# Patient Record
Sex: MALE | Race: OTHER | NOT HISPANIC OR LATINO | URBAN - METROPOLITAN AREA
[De-identification: names, ages, dates, MRNs, and addresses within clinical notes are randomized per-mention and may not be internally consistent; named-entity substitution may affect disease eponyms.]

---

## 2021-11-12 ENCOUNTER — INPATIENT (INPATIENT)
Facility: HOSPITAL | Age: 46
LOS: 2 days | Discharge: HOME | DRG: 247 | End: 2021-11-15
Attending: INTERNAL MEDICINE | Admitting: INTERNAL MEDICINE
Payer: COMMERCIAL

## 2021-11-12 VITALS — WEIGHT: 268.08 LBS

## 2021-11-12 DIAGNOSIS — I21.3 ST ELEVATION (STEMI) MYOCARDIAL INFARCTION OF UNSPECIFIED SITE: ICD-10-CM

## 2021-11-12 LAB
A1C WITH ESTIMATED AVERAGE GLUCOSE RESULT: 5.8 % — HIGH (ref 4–5.6)
ALBUMIN SERPL ELPH-MCNC: 4.1 G/DL — SIGNIFICANT CHANGE UP (ref 3.3–5)
ALBUMIN SERPL ELPH-MCNC: 4.1 G/DL — SIGNIFICANT CHANGE UP (ref 3.3–5)
ALBUMIN SERPL ELPH-MCNC: 4.2 G/DL — SIGNIFICANT CHANGE UP (ref 3.3–5)
ALP SERPL-CCNC: 142 U/L — HIGH (ref 40–120)
ALP SERPL-CCNC: 145 U/L — HIGH (ref 40–120)
ALP SERPL-CCNC: 147 U/L — HIGH (ref 40–120)
ALT FLD-CCNC: 18 U/L — SIGNIFICANT CHANGE UP (ref 10–45)
ALT FLD-CCNC: 19 U/L — SIGNIFICANT CHANGE UP (ref 10–45)
ALT FLD-CCNC: 28 U/L — SIGNIFICANT CHANGE UP (ref 10–45)
ANION GAP SERPL CALC-SCNC: 13 MMOL/L — SIGNIFICANT CHANGE UP (ref 5–17)
ANION GAP SERPL CALC-SCNC: 14 MMOL/L — SIGNIFICANT CHANGE UP (ref 5–17)
ANION GAP SERPL CALC-SCNC: 17 MMOL/L — SIGNIFICANT CHANGE UP (ref 5–17)
ANISOCYTOSIS BLD QL: SLIGHT — SIGNIFICANT CHANGE UP
APTT BLD: 141.1 SEC — CRITICAL HIGH (ref 27.5–35.5)
AST SERPL-CCNC: 105 U/L — HIGH (ref 10–40)
AST SERPL-CCNC: 31 U/L — SIGNIFICANT CHANGE UP (ref 10–40)
AST SERPL-CCNC: 47 U/L — HIGH (ref 10–40)
BASOPHILS # BLD AUTO: 0.11 K/UL — SIGNIFICANT CHANGE UP (ref 0–0.2)
BASOPHILS NFR BLD AUTO: 0.9 % — SIGNIFICANT CHANGE UP (ref 0–2)
BILIRUB SERPL-MCNC: 0.5 MG/DL — SIGNIFICANT CHANGE UP (ref 0.2–1.2)
BILIRUB SERPL-MCNC: 0.5 MG/DL — SIGNIFICANT CHANGE UP (ref 0.2–1.2)
BILIRUB SERPL-MCNC: 1.2 MG/DL — SIGNIFICANT CHANGE UP (ref 0.2–1.2)
BLD GP AB SCN SERPL QL: NEGATIVE — SIGNIFICANT CHANGE UP
BUN SERPL-MCNC: 11 MG/DL — SIGNIFICANT CHANGE UP (ref 7–23)
BUN SERPL-MCNC: 12 MG/DL — SIGNIFICANT CHANGE UP (ref 7–23)
BUN SERPL-MCNC: 9 MG/DL — SIGNIFICANT CHANGE UP (ref 7–23)
CALCIUM SERPL-MCNC: 9 MG/DL — SIGNIFICANT CHANGE UP (ref 8.4–10.5)
CALCIUM SERPL-MCNC: 9.2 MG/DL — SIGNIFICANT CHANGE UP (ref 8.4–10.5)
CALCIUM SERPL-MCNC: 9.3 MG/DL — SIGNIFICANT CHANGE UP (ref 8.4–10.5)
CHLORIDE SERPL-SCNC: 102 MMOL/L — SIGNIFICANT CHANGE UP (ref 96–108)
CHLORIDE SERPL-SCNC: 107 MMOL/L — SIGNIFICANT CHANGE UP (ref 96–108)
CHLORIDE SERPL-SCNC: 107 MMOL/L — SIGNIFICANT CHANGE UP (ref 96–108)
CHOLEST SERPL-MCNC: 212 MG/DL — HIGH
CK MB BLD-MCNC: 7.4 % — HIGH (ref 0–3.5)
CK MB BLD-MCNC: 9.4 % — HIGH (ref 0–3.5)
CK MB CFR SERPL CALC: 49 NG/ML — HIGH (ref 0–6.7)
CK MB CFR SERPL CALC: 59.9 NG/ML — HIGH (ref 0–6.7)
CK SERPL-CCNC: 524 U/L — HIGH (ref 30–200)
CK SERPL-CCNC: 806 U/L — HIGH (ref 30–200)
CO2 SERPL-SCNC: 17 MMOL/L — LOW (ref 22–31)
CO2 SERPL-SCNC: 20 MMOL/L — LOW (ref 22–31)
CO2 SERPL-SCNC: 21 MMOL/L — LOW (ref 22–31)
COVID-19 NUCLEOCAPSID GAM AB INTERP: NEGATIVE — SIGNIFICANT CHANGE UP
COVID-19 NUCLEOCAPSID TOTAL GAM ANTIBODY RESULT: 0.08 INDEX — SIGNIFICANT CHANGE UP
CREAT SERPL-MCNC: 0.96 MG/DL — SIGNIFICANT CHANGE UP (ref 0.5–1.3)
CREAT SERPL-MCNC: 1.02 MG/DL — SIGNIFICANT CHANGE UP (ref 0.5–1.3)
CREAT SERPL-MCNC: 1.11 MG/DL — SIGNIFICANT CHANGE UP (ref 0.5–1.3)
DACRYOCYTES BLD QL SMEAR: SLIGHT — SIGNIFICANT CHANGE UP
ELLIPTOCYTES BLD QL SMEAR: SLIGHT — SIGNIFICANT CHANGE UP
EOSINOPHIL # BLD AUTO: 0 K/UL — SIGNIFICANT CHANGE UP (ref 0–0.5)
EOSINOPHIL NFR BLD AUTO: 0 % — SIGNIFICANT CHANGE UP (ref 0–6)
ESTIMATED AVERAGE GLUCOSE: 120 MG/DL — HIGH (ref 68–114)
GLUCOSE SERPL-MCNC: 114 MG/DL — HIGH (ref 70–99)
GLUCOSE SERPL-MCNC: 116 MG/DL — HIGH (ref 70–99)
GLUCOSE SERPL-MCNC: 153 MG/DL — HIGH (ref 70–99)
HCT VFR BLD CALC: 37.4 % — LOW (ref 39–50)
HDLC SERPL-MCNC: 26 MG/DL — LOW
HGB BLD-MCNC: 10.5 G/DL — LOW (ref 13–17)
INR BLD: 1.13 RATIO — SIGNIFICANT CHANGE UP (ref 0.88–1.16)
LIPID PNL WITH DIRECT LDL SERPL: 118 MG/DL — HIGH
LYMPHOCYTES # BLD AUTO: 1.19 K/UL — SIGNIFICANT CHANGE UP (ref 1–3.3)
LYMPHOCYTES # BLD AUTO: 9.8 % — LOW (ref 13–44)
MACROCYTES BLD QL: SLIGHT — SIGNIFICANT CHANGE UP
MAGNESIUM SERPL-MCNC: 2 MG/DL — SIGNIFICANT CHANGE UP (ref 1.6–2.6)
MAGNESIUM SERPL-MCNC: 2 MG/DL — SIGNIFICANT CHANGE UP (ref 1.6–2.6)
MAGNESIUM SERPL-MCNC: 2.1 MG/DL — SIGNIFICANT CHANGE UP (ref 1.6–2.6)
MANUAL SMEAR VERIFICATION: SIGNIFICANT CHANGE UP
MCHC RBC-ENTMCNC: 20.2 PG — LOW (ref 27–34)
MCHC RBC-ENTMCNC: 28.1 GM/DL — LOW (ref 32–36)
MCV RBC AUTO: 72.1 FL — LOW (ref 80–100)
MICROCYTES BLD QL: SIGNIFICANT CHANGE UP
MONOCYTES # BLD AUTO: 0.54 K/UL — SIGNIFICANT CHANGE UP (ref 0–0.9)
MONOCYTES NFR BLD AUTO: 4.5 % — SIGNIFICANT CHANGE UP (ref 2–14)
NEUTROPHILS # BLD AUTO: 10.27 K/UL — HIGH (ref 1.8–7.4)
NEUTROPHILS NFR BLD AUTO: 84.8 % — HIGH (ref 43–77)
NON HDL CHOLESTEROL: 187 MG/DL — HIGH
NT-PROBNP SERPL-SCNC: 708 PG/ML — HIGH (ref 0–300)
OVALOCYTES BLD QL SMEAR: SLIGHT — SIGNIFICANT CHANGE UP
PHOSPHATE SERPL-MCNC: 2.6 MG/DL — SIGNIFICANT CHANGE UP (ref 2.5–4.5)
PHOSPHATE SERPL-MCNC: 3.4 MG/DL — SIGNIFICANT CHANGE UP (ref 2.5–4.5)
PLAT MORPH BLD: NORMAL — SIGNIFICANT CHANGE UP
PLATELET # BLD AUTO: 342 K/UL — SIGNIFICANT CHANGE UP (ref 150–400)
POLYCHROMASIA BLD QL SMEAR: SLIGHT — SIGNIFICANT CHANGE UP
POTASSIUM SERPL-MCNC: 3.7 MMOL/L — SIGNIFICANT CHANGE UP (ref 3.5–5.3)
POTASSIUM SERPL-MCNC: 3.9 MMOL/L — SIGNIFICANT CHANGE UP (ref 3.5–5.3)
POTASSIUM SERPL-MCNC: 3.9 MMOL/L — SIGNIFICANT CHANGE UP (ref 3.5–5.3)
POTASSIUM SERPL-SCNC: 3.7 MMOL/L — SIGNIFICANT CHANGE UP (ref 3.5–5.3)
POTASSIUM SERPL-SCNC: 3.9 MMOL/L — SIGNIFICANT CHANGE UP (ref 3.5–5.3)
POTASSIUM SERPL-SCNC: 3.9 MMOL/L — SIGNIFICANT CHANGE UP (ref 3.5–5.3)
PROT SERPL-MCNC: 7.1 G/DL — SIGNIFICANT CHANGE UP (ref 6–8.3)
PROT SERPL-MCNC: 7.5 G/DL — SIGNIFICANT CHANGE UP (ref 6–8.3)
PROT SERPL-MCNC: 7.7 G/DL — SIGNIFICANT CHANGE UP (ref 6–8.3)
PROTHROM AB SERPL-ACNC: 13.5 SEC — SIGNIFICANT CHANGE UP (ref 10.6–13.6)
RBC # BLD: 5.19 M/UL — SIGNIFICANT CHANGE UP (ref 4.2–5.8)
RBC # FLD: 16.2 % — HIGH (ref 10.3–14.5)
RBC BLD AUTO: ABNORMAL
RH IG SCN BLD-IMP: POSITIVE — SIGNIFICANT CHANGE UP
RH IG SCN BLD-IMP: POSITIVE — SIGNIFICANT CHANGE UP
SARS-COV-2 IGG+IGM SERPL QL IA: 0.08 INDEX — SIGNIFICANT CHANGE UP
SARS-COV-2 IGG+IGM SERPL QL IA: NEGATIVE — SIGNIFICANT CHANGE UP
SARS-COV-2 RNA SPEC QL NAA+PROBE: SIGNIFICANT CHANGE UP
SODIUM SERPL-SCNC: 137 MMOL/L — SIGNIFICANT CHANGE UP (ref 135–145)
SODIUM SERPL-SCNC: 140 MMOL/L — SIGNIFICANT CHANGE UP (ref 135–145)
SODIUM SERPL-SCNC: 141 MMOL/L — SIGNIFICANT CHANGE UP (ref 135–145)
TARGETS BLD QL SMEAR: SLIGHT — SIGNIFICANT CHANGE UP
TRIGL SERPL-MCNC: 343 MG/DL — HIGH
TROPONIN T, HIGH SENSITIVITY RESULT: 2142 NG/L — HIGH (ref 0–51)
TROPONIN T, HIGH SENSITIVITY RESULT: 636 NG/L — HIGH (ref 0–51)
TROPONIN T, HIGH SENSITIVITY RESULT: 905 NG/L — HIGH (ref 0–51)
TSH SERPL-MCNC: 1.25 UIU/ML — SIGNIFICANT CHANGE UP (ref 0.27–4.2)
WBC # BLD: 12.11 K/UL — HIGH (ref 3.8–10.5)
WBC # FLD AUTO: 12.11 K/UL — HIGH (ref 3.8–10.5)

## 2021-11-12 PROCEDURE — 99232 SBSQ HOSP IP/OBS MODERATE 35: CPT

## 2021-11-12 PROCEDURE — 92978 ENDOLUMINL IVUS OCT C 1ST: CPT | Mod: 26,LD

## 2021-11-12 PROCEDURE — 92921: CPT | Mod: NC,LD

## 2021-11-12 PROCEDURE — 93010 ELECTROCARDIOGRAM REPORT: CPT

## 2021-11-12 PROCEDURE — 93306 TTE W/DOPPLER COMPLETE: CPT | Mod: 26

## 2021-11-12 PROCEDURE — 92941 PRQ TRLML REVSC TOT OCCL AMI: CPT | Mod: LD

## 2021-11-12 PROCEDURE — 93010 ELECTROCARDIOGRAM REPORT: CPT | Mod: 59

## 2021-11-12 PROCEDURE — 99152 MOD SED SAME PHYS/QHP 5/>YRS: CPT

## 2021-11-12 PROCEDURE — 99291 CRITICAL CARE FIRST HOUR: CPT

## 2021-11-12 PROCEDURE — 99291 CRITICAL CARE FIRST HOUR: CPT | Mod: 25

## 2021-11-12 PROCEDURE — 71045 X-RAY EXAM CHEST 1 VIEW: CPT | Mod: 26

## 2021-11-12 PROCEDURE — 93458 L HRT ARTERY/VENTRICLE ANGIO: CPT | Mod: 26,59

## 2021-11-12 RX ORDER — LISINOPRIL 2.5 MG/1
5 TABLET ORAL DAILY
Refills: 0 | Status: DISCONTINUED | OUTPATIENT
Start: 2021-11-12 | End: 2021-11-15

## 2021-11-12 RX ORDER — HEPARIN SODIUM 5000 [USP'U]/ML
4000 INJECTION INTRAVENOUS; SUBCUTANEOUS EVERY 6 HOURS
Refills: 0 | Status: DISCONTINUED | OUTPATIENT
Start: 2021-11-12 | End: 2021-11-12

## 2021-11-12 RX ORDER — ATORVASTATIN CALCIUM 80 MG/1
80 TABLET, FILM COATED ORAL AT BEDTIME
Refills: 0 | Status: DISCONTINUED | OUTPATIENT
Start: 2021-11-12 | End: 2021-11-15

## 2021-11-12 RX ORDER — HEPARIN SODIUM 5000 [USP'U]/ML
1000 INJECTION INTRAVENOUS; SUBCUTANEOUS
Qty: 25000 | Refills: 0 | Status: DISCONTINUED | OUTPATIENT
Start: 2021-11-12 | End: 2021-11-12

## 2021-11-12 RX ORDER — LISINOPRIL 2.5 MG/1
2.5 TABLET ORAL DAILY
Refills: 0 | Status: DISCONTINUED | OUTPATIENT
Start: 2021-11-12 | End: 2021-11-12

## 2021-11-12 RX ORDER — HEPARIN SODIUM 5000 [USP'U]/ML
INJECTION INTRAVENOUS; SUBCUTANEOUS
Qty: 25000 | Refills: 0 | Status: DISCONTINUED | OUTPATIENT
Start: 2021-11-12 | End: 2021-11-12

## 2021-11-12 RX ORDER — ASPIRIN/CALCIUM CARB/MAGNESIUM 324 MG
81 TABLET ORAL DAILY
Refills: 0 | Status: DISCONTINUED | OUTPATIENT
Start: 2021-11-12 | End: 2021-11-15

## 2021-11-12 RX ORDER — POTASSIUM CHLORIDE 20 MEQ
20 PACKET (EA) ORAL ONCE
Refills: 0 | Status: DISCONTINUED | OUTPATIENT
Start: 2021-11-12 | End: 2021-11-13

## 2021-11-12 RX ORDER — METOPROLOL TARTRATE 50 MG
25 TABLET ORAL
Refills: 0 | Status: COMPLETED | OUTPATIENT
Start: 2021-11-12 | End: 2021-11-13

## 2021-11-12 RX ORDER — HYDRALAZINE HCL 50 MG
10 TABLET ORAL ONCE
Refills: 0 | Status: COMPLETED | OUTPATIENT
Start: 2021-11-12 | End: 2021-11-12

## 2021-11-12 RX ORDER — INFLUENZA VIRUS VACCINE 15; 15; 15; 15 UG/.5ML; UG/.5ML; UG/.5ML; UG/.5ML
0.5 SUSPENSION INTRAMUSCULAR ONCE
Refills: 0 | Status: DISCONTINUED | OUTPATIENT
Start: 2021-11-12 | End: 2021-11-15

## 2021-11-12 RX ORDER — METOPROLOL TARTRATE 50 MG
12.5 TABLET ORAL
Refills: 0 | Status: DISCONTINUED | OUTPATIENT
Start: 2021-11-12 | End: 2021-11-12

## 2021-11-12 RX ORDER — TICAGRELOR 90 MG/1
1 TABLET ORAL
Qty: 60 | Refills: 0
Start: 2021-11-12 | End: 2021-12-11

## 2021-11-12 RX ORDER — TICAGRELOR 90 MG/1
90 TABLET ORAL EVERY 12 HOURS
Refills: 0 | Status: DISCONTINUED | OUTPATIENT
Start: 2021-11-12 | End: 2021-11-15

## 2021-11-12 RX ORDER — CHLORHEXIDINE GLUCONATE 213 G/1000ML
1 SOLUTION TOPICAL
Refills: 0 | Status: DISCONTINUED | OUTPATIENT
Start: 2021-11-12 | End: 2021-11-15

## 2021-11-12 RX ADMIN — Medication 10 MILLIGRAM(S): at 17:05

## 2021-11-12 RX ADMIN — TICAGRELOR 90 MILLIGRAM(S): 90 TABLET ORAL at 17:05

## 2021-11-12 RX ADMIN — Medication 12.5 MILLIGRAM(S): at 08:48

## 2021-11-12 RX ADMIN — HEPARIN SODIUM 10 UNIT(S)/HR: 5000 INJECTION INTRAVENOUS; SUBCUTANEOUS at 06:45

## 2021-11-12 RX ADMIN — ATORVASTATIN CALCIUM 80 MILLIGRAM(S): 80 TABLET, FILM COATED ORAL at 21:36

## 2021-11-12 RX ADMIN — CHLORHEXIDINE GLUCONATE 1 APPLICATION(S): 213 SOLUTION TOPICAL at 06:02

## 2021-11-12 RX ADMIN — HEPARIN SODIUM 1000 UNIT(S)/HR: 5000 INJECTION INTRAVENOUS; SUBCUTANEOUS at 01:58

## 2021-11-12 RX ADMIN — Medication 25 MILLIGRAM(S): at 14:10

## 2021-11-12 RX ADMIN — TICAGRELOR 90 MILLIGRAM(S): 90 TABLET ORAL at 06:02

## 2021-11-12 RX ADMIN — LISINOPRIL 2.5 MILLIGRAM(S): 2.5 TABLET ORAL at 14:02

## 2021-11-12 RX ADMIN — Medication 81 MILLIGRAM(S): at 11:55

## 2021-11-12 NOTE — ED ADULT NURSE REASSESSMENT NOTE - NS ED NURSE REASSESS COMMENT FT1
Pt ordered for Heparin infusion following ACS nomogram. Per MD Galeas, pt okay to have Heparin infusion started before coagulants result and that pt does not require Heparin bolus because he received a 5000unit bolus at [facility]. Heparin infusion started with 2nd RN Cody. Pt made aware of signs & symptoms of bleeding to make RN or MD aware of. Cardiology MD at bedside.

## 2021-11-12 NOTE — H&P ADULT - ASSESSMENT
45 y/o M no known pmh here with 4 days of chest pain which worsened today and began to radiate to both arms. Went to [facility], found to have inf wall stemi and transferred to [facility] on heparin gtt after asa/plavix load. Upon arrival NINFA resolved and pt CP free.           Neuro  - A+Ox3 ANA LAURA    Pulm  - resting comfortably on RA    Card  #ACS  - NINFA resolved on EKG, pt cp free  - Will need non emergent cath  - GDMT - DAPT w/ asa brilinta, statin, hold BB for now until TTE  -STAT TTE in AM    GI  - DASH diet    Endo  - Check A1C  - ISS as needed    Renal  - trend Cr  - K>4, Mg >2  Heme  - was on hep gtt -> DAPT  - monitor CBC    ID  - no infectious sx, afebrile, monitor off abx  -leukocytosis likely reactive 47 y/o M no known pmh here with 4 days of chest pain which worsened today and began to radiate to both arms. Went to [facility], found to have inf wall stemi and transferred to [facility] on heparin gtt after asa/plavix load. Upon arrival NINFA resolved and pt CP free.       Neuro  - A+Ox3 ANA LAURA    Pulm  - resting comfortably on RA    Card  #ACS  - NINFA resolved on EKG, pt cp free  - Will need non emergent cath  - GDMT - DAPT w/ asa brilinta, statin, hold BB for now until TTE  - STAT TTE in AM  - Trend CE    GI  - DASH diet    Endo  - Check A1C  - ISS as needed    Renal  - trend Cr  - K>4, Mg >2  Heme  - was on hep gtt -> DAPT  - monitor CBC    ID  - no infectious sx, afebrile, monitor off abx  - leukocytosis likely reactive

## 2021-11-12 NOTE — H&P ADULT - NSHPLABSRESULTS_GEN_ALL_CORE
.  LABS:                         10.5   12.11 )-----------( 342      ( 12 Nov 2021 01:48 )             37.4     11-12    140  |  107  |  12  ----------------------------<  153<H>  3.7   |  20<L>  |  1.11    Ca    9.2      12 Nov 2021 01:48  Mg     2.0     11-12    TPro  7.5  /  Alb  4.1  /  TBili  0.5  /  DBili  x   /  AST  31  /  ALT  19  /  AlkPhos  145<H>  11-12    PT/INR - ( 12 Nov 2021 01:48 )   PT: 13.5 sec;   INR: 1.13 ratio         PTT - ( 12 Nov 2021 01:48 )  PTT:141.1 sec    Serum Pro-Brain Natriuretic Peptide: 708 pg/mL (11-12 @ 01:48)        RADIOLOGY, EKG & ADDITIONAL TESTS: Reviewed.     ICU Vital Signs Last 24 Hrs  T(C): 36.8 (12 Nov 2021 05:34), Max: 36.8 (12 Nov 2021 04:41)  T(F): 98.2 (12 Nov 2021 05:34), Max: 98.2 (12 Nov 2021 04:41)  HR: 98 (12 Nov 2021 06:00) (98 - 107)  BP: 141/92 (12 Nov 2021 06:00) (132/86 - 158/86)  BP(mean): 111 (12 Nov 2021 06:00) (97 - 117)  ABP: --  ABP(mean): --  RR: 14 (12 Nov 2021 06:00) (14 - 24)  SpO2: 97% (12 Nov 2021 06:00) (96% - 98%)

## 2021-11-12 NOTE — H&P ADULT - ATTENDING COMMENTS
Admitted with late presentation lateral wall STEMI   Will go for cath today  DAPT with ASA, Ticagrelor   Lipitor 80  Hemodynamics acceptable, chest pain free s/p Nitro SL  Check TTE  Trend cardiac enzymes until peak  O2 sats mid 90s on nasal cannula  Normal renal function  H/H low but acceptable on Heparin gtt for ACS  COVID negative, no antibiotics   Sugars controlled, check Hgb A1c   No central access

## 2021-11-12 NOTE — ED ADULT NURSE NOTE - OBJECTIVE STATEMENT
Pt is a 47 y/o male presents to the ED BIBA transferred from [facility] for STEMI. Pt says he has had chest pain that started on Monday and wke him up from sleep, states he felt like it was GERD so he ignored it. He then tonight had episodes of the chest discomfort that were associated with b/l arm pain/numbness & diaphoresis so he went to OSH. At OSH his EKG had elevations and he was hypertensive, received 180mg of Brilinta, 324 of ASA. 0.4 sublingual NTG, NTG paste & 5mg of Metoprolol push x 2. Pt presents to [facility] well appearing, states chest pain at this time is resolved. He is alert & oriented x 4, calm, able to follow commands, speech clear. Breathing spontaneous & nonlabored. On cardiac monitor, sinus tach 105. Abdomen soft & nondistended. Strength 5/5 x 4 extremities, ambulates without assist. Strong peripheral pulses noted b/l, no edema noted. Skin warm, clean, dry & intact. Denies SOB, abdominal pain,  n/v/d, fever, chills, changes in vision. Call bell within reach, bed in lowest position, side rails up, wheels locked. Pt is a 47 y/o male presents to the ED BIBA transferred from [facility] for STEMI. Pt says he has had chest pain that started on Monday and wke him up from sleep, states he felt like it was GERD so he ignored it. He then tonight had episodes of the chest discomfort that were associated with b/l arm pain/numbness & diaphoresis so he went to OSH. At OSH his EKG had elevations and he was hypertensive, received 180mg of Brilinta, 324 of ASA. 0.4 sublingual NTG, NTG paste & 5mg of Metoprolol push x 2. Pt presents to [facility] well appearing, states chest pain at this time is resolved. He is alert & oriented x 4, calm, able to follow commands, speech clear. Breathing spontaneous & nonlabored. On cardiac monitor, sinus tach 105. Abdomen soft & nondistended. Strength 5/5 x 4 extremities, ambulates without assist. Strong peripheral pulses noted b/l, no edema noted. Skin warm, clean, dry & intact. Denies SOB, abdominal pain,  n/v/d, fever, chills, changes in vision. Call bell within reach, bed in lowest position, side rails up, wheels locked.

## 2021-11-12 NOTE — PROGRESS NOTE ADULT - SUBJECTIVE AND OBJECTIVE BOX
LOU RICCI  MRN-[id number]  Patient is a 46y old  Male who presents with a chief complaint of NSTEMI (12 Nov 2021 06:25)    HPI:  45 y/o M no known pmh here with 4 days of chest pain which worsened today and began to radiate to both arms. Went to [facility], found to have ant wall stemi and transferred to [facility] on heparin gtt after asa/plavix load. Upon arrival s/p NTG x1 pt CP free. EKG no longer with elevations. Decision made to defer cath for now.     Currently pt feels well resting comfortably   (12 Nov 2021 06:25)      Hospital Course:    24 HOUR EVENTS:    REVIEW OF SYSTEMS:    CONSTITUTIONAL: No weakness, fevers or chills  EYES/ENT: No visual changes;  No vertigo or throat pain   NECK: No pain or stiffness  RESPIRATORY: No cough, wheezing, hemoptysis; No shortness of breath  CARDIOVASCULAR: No chest pain or palpitations  GASTROINTESTINAL: No abdominal or epigastric pain. No nausea, vomiting, or hematemesis; No diarrhea or constipation. No melena or hematochezia.  GENITOURINARY: No dysuria, frequency or hematuria  NEUROLOGICAL: No numbness or weakness  SKIN: No itching, rashes      ICU Vital Signs Last 24 Hrs  T(C): 36.9 (12 Nov 2021 19:00), Max: 36.9 (12 Nov 2021 19:00)  T(F): 98.4 (12 Nov 2021 19:00), Max: 98.4 (12 Nov 2021 19:00)  HR: 101 (12 Nov 2021 19:00) (89 - 107)  BP: 158/83 (12 Nov 2021 19:00) (130/75 - 177/92)  BP(mean): 112 (12 Nov 2021 19:00) (95 - 127)  ABP: --  ABP(mean): --  RR: 28 (12 Nov 2021 19:00) (14 - 32)  SpO2: 96% (12 Nov 2021 19:00) (92% - 99%)    I&O's Summary    11 Nov 2021 07:01  -  12 Nov 2021 07:00  --------------------------------------------------------  IN: 130 mL / OUT: 500 mL / NET: -370 mL    12 Nov 2021 07:01  -  12 Nov 2021 19:46  --------------------------------------------------------  IN: 340 mL / OUT: 2420 mL / NET: -2080 mL      CAPILLARY BLOOD GLUCOSE          PHYSICAL EXAM:  GENERAL: No acute distress, well-developed  HEAD:  Atraumatic, Normocephalic  EYES: EOMI, PERRLA, conjunctiva and sclera clear  NECK: Supple, no lymphadenopathy, no JVD  CHEST/LUNG: CTAB; No wheezes, rales, or rhonchi  HEART: Regular rate and rhythm. Normal S1/S2. No murmurs, rubs, or gallops  ABDOMEN: Soft, non-tender, non-distended; normal bowel sounds, no organomegaly  EXTREMITIES:  2+ peripheral pulses b/l, No clubbing, cyanosis, or edema  NEUROLOGY: A&O x 3, no focal deficits  SKIN: No rashes or lesions    ============================I/O===========================   I&O's Detail    11 Nov 2021 07:01  -  12 Nov 2021 07:00  --------------------------------------------------------  IN:    Heparin: 10 mL    Oral Fluid: 120 mL  Total IN: 130 mL    OUT:    Voided (mL): 500 mL  Total OUT: 500 mL    Total NET: -370 mL      12 Nov 2021 07:01  -  12 Nov 2021 19:46  --------------------------------------------------------  IN:    Heparin: 40 mL    Oral Fluid: 300 mL  Total IN: 340 mL    OUT:    Voided (mL): 2420 mL  Total OUT: 2420 mL    Total NET: -2080 mL        ============================ LABS =========================                        10.5   12.11 )-----------( 342      ( 12 Nov 2021 01:48 )             37.4     11-12    141  |  107  |  11  ----------------------------<  114<H>  3.9   |  17<L>  |  0.96    Ca    9.0      12 Nov 2021 06:18  Phos  2.6     11-12  Mg     2.0     11-12    TPro  7.1  /  Alb  4.2  /  TBili  0.5  /  DBili  x   /  AST  47<H>  /  ALT  18  /  AlkPhos  142<H>  11-12    Troponin T, High Sensitivity Result: 905 ng/L (11-12-21 @ 06:18)  Troponin T, High Sensitivity Result: 636 ng/L (11-12-21 @ 01:48)    CKMB Units: 49.0 ng/mL (11-12-21 @ 06:18)    Creatine Kinase, Serum: 524 U/L (11-12-21 @ 06:18)    CPK Mass Assay %: 9.4 % (11-12-21 @ 06:18)        LIVER FUNCTIONS - ( 12 Nov 2021 06:18 )  Alb: 4.2 g/dL / Pro: 7.1 g/dL / ALK PHOS: 142 U/L / ALT: 18 U/L / AST: 47 U/L / GGT: x           PT/INR - ( 12 Nov 2021 01:48 )   PT: 13.5 sec;   INR: 1.13 ratio         PTT - ( 12 Nov 2021 01:48 )  PTT:141.1 sec    Blood Gas Venous - Lactate: 1.8 mmol/L (11-12-21 @ 01:40)      ======================Micro/Rad/Cardio=================  Telemtry: Reviewed   EKG: Reviewed  CXR: Reviewed  Culture: Reviewed   Echo:   Cath:   ======================================================  PAST MEDICAL & SURGICAL HISTORY:    ====================ASSESSMENT ==============        Plan:  ====================== NEUROLOGY=====================  - A+Ox3 ANA LAURA    ==================== RESPIRATORY======================  - resting comfortably on RA    ====================CARDIOVASCULAR==================  Card  #ACS  - NINFA resolved on EKG, pt cp free  - Will need non emergent cath  - GDMT - DAPT w/ asa brilinta, statin, hold BB for now until TTE  - STAT TTE in AM  - Trend CE  - lopressor for rate control   - ASA/lipitor for graft patency    lisinopril 5 milliGRAM(s) Oral daily  metoprolol tartrate 25 milliGRAM(s) Oral two times a day  atorvastatin 80 milliGRAM(s) Oral at bedtime  aspirin enteric coated 81 milliGRAM(s) Oral daily    ===================HEMATOLOGIC/ONC ===================  - was on hep gtt -> DAPT  - monitor CBC    ===================== RENAL =========================  - trend Cr  - K>4, Mg >2    ==================== GASTROINTESTINAL===================  - DASH diet    =======================    ENDOCRINE  =====================  - Check A1C  - ISS as needed    ========================INFECTIOUS DISEASE================  - no infectious sx, afebrile, monitor off abx  - leukocytosis likely reactive  - WBC 12.11, continue trending     Patient requires continuous monitoring with bedside rhythm monitoring, pulse ox monitoring, and intermittent blood gas analysis. Care plan discussed with ICU care team. Patient remained critical and at risk for life threatening decompensation.  Patient seen, examined and plan discussed with CCU team during rounds.     I have personally provided ____ minutes of critical care time excluding time spent on separate procedures, in addition to initial critical care time provided by the CICU Attending, Dr. Delcid/ Jasiel/ Terra/ Leo/ Luis/ Rodney .     By signing my name below, I, Kiki Bartholomew, attest that this documentation has been prepared under the direction and in the presence of NANCY Maier   Electronically signed: Masha Brito, 11-12-21 @ 19:46    I, Latha Piña, personally performed the services described in this documentation. all medical record entries made by the veritoibe were at my direction and in my presence. I have reviewed the chart and agree that the record reflects my personal performance and is accurate and complete  Electronically signed: NANCY Maier        LOU RICCI  MRN-[id number]  Patient is a 46y old  Male who presents with a chief complaint of NSTEMI (12 Nov 2021 06:25)    HPI:  47 y/o M no known pmh here with 4 days of chest pain which worsened today and began to radiate to both arms. Went to [facility], found to have ant wall stemi and transferred to [facility] on heparin gtt after asa/plavix load. Upon arrival s/p NTG x1 pt CP free. EKG no longer with elevations. Decision made to defer cath for now.     Currently pt feels well resting comfortably   (12 Nov 2021 06:25)      Hospital Course:  11/12 admitted with STEMI, HTN to 210/130 with improvement in CP, BP and NINFA with SL nitro + nitro paste  11/13 s/p LHC with HALLE x1 to 80% LAD and POBA to diagonal    24 HOUR EVENTS:  - no acute events. Chest pain free    REVIEW OF SYSTEMS:  RESPIRATORY: No shortness of breath  CARDIOVASCULAR: No chest pain or palpitations, no orthopnea  GASTROINTESTINAL: No abdominal pain.    ICU Vital Signs Last 24 Hrs  T(C): 36.9 (12 Nov 2021 19:00), Max: 36.9 (12 Nov 2021 19:00)  T(F): 98.4 (12 Nov 2021 19:00), Max: 98.4 (12 Nov 2021 19:00)  HR: 101 (12 Nov 2021 19:00) (89 - 107)  BP: 158/83 (12 Nov 2021 19:00) (130/75 - 177/92)  BP(mean): 112 (12 Nov 2021 19:00) (95 - 127)  RR: 28 (12 Nov 2021 19:00) (14 - 32)  SpO2: 96% (12 Nov 2021 19:00) (92% - 99%)    I&O's Summary    11 Nov 2021 07:01  -  12 Nov 2021 07:00  --------------------------------------------------------  IN: 130 mL / OUT: 500 mL / NET: -370 mL    12 Nov 2021 07:01  -  12 Nov 2021 19:46  --------------------------------------------------------  IN: 340 mL / OUT: 2420 mL / NET: -2080 mL        PHYSICAL EXAM:  GENERAL: No acute distress, well-developed  HEAD:  Atraumatic, Normocephalic  EYES: EOMI, PERRLA, conjunctiva and sclera clear  NECK: Supple, no lymphadenopathy, no JVD  CHEST/LUNG: CTAB; No wheezes, rales, or rhonchi  HEART: Regular rate and rhythm. Normal S1/S2. No murmurs, rubs, or gallops  ABDOMEN: Soft, non-tender, non-distended; normal bowel sounds, no organomegaly  EXTREMITIES:  2+ peripheral pulses b/l, No clubbing, cyanosis, or edema  NEUROLOGY: A&O x 3, no focal deficits  SKIN: No rashes or lesions    ============================I/O===========================   I&O's Detail    11 Nov 2021 07:01  -  12 Nov 2021 07:00  --------------------------------------------------------  IN:    Heparin: 10 mL    Oral Fluid: 120 mL  Total IN: 130 mL    OUT:    Voided (mL): 500 mL  Total OUT: 500 mL    Total NET: -370 mL      12 Nov 2021 07:01  -  12 Nov 2021 19:46  --------------------------------------------------------  IN:    Heparin: 40 mL    Oral Fluid: 300 mL  Total IN: 340 mL    OUT:    Voided (mL): 2420 mL  Total OUT: 2420 mL    Total NET: -2080 mL        ============================ LABS =========================                        10.5   12.11 )-----------( 342      ( 12 Nov 2021 01:48 )             37.4     11-12    141  |  107  |  11  ----------------------------<  114<H>  3.9   |  17<L>  |  0.96    Ca    9.0      12 Nov 2021 06:18  Phos  2.6     11-12  Mg     2.0     11-12    TPro  7.1  /  Alb  4.2  /  TBili  0.5  /  DBili  x   /  AST  47<H>  /  ALT  18  /  AlkPhos  142<H>  11-12    Troponin T, High Sensitivity Result: 905 ng/L (11-12-21 @ 06:18)  Troponin T, High Sensitivity Result: 636 ng/L (11-12-21 @ 01:48)    CKMB Units: 49.0 ng/mL (11-12-21 @ 06:18)    Creatine Kinase, Serum: 524 U/L (11-12-21 @ 06:18)    CPK Mass Assay %: 9.4 % (11-12-21 @ 06:18)        LIVER FUNCTIONS - ( 12 Nov 2021 06:18 )  Alb: 4.2 g/dL / Pro: 7.1 g/dL / ALK PHOS: 142 U/L / ALT: 18 U/L / AST: 47 U/L / GGT: x           PT/INR - ( 12 Nov 2021 01:48 )   PT: 13.5 sec;   INR: 1.13 ratio         PTT - ( 12 Nov 2021 01:48 )  PTT:141.1 sec    Blood Gas Venous - Lactate: 1.8 mmol/L (11-12-21 @ 01:40)      ======================Micro/Rad/Cardio=================  Telemtry: Reviewed   EKG: Reviewed  CXR: Reviewed  Culture: Reviewed   Echo:   Cath:   ======================================================  PAST MEDICAL & SURGICAL HISTORY:    ====================ASSESSMENT ==============        Plan:  ====================== NEUROLOGY=====================  No active issues  - AAOx4, nonfocal, neuro checks per protocol    ==================== RESPIRATORY======================  No active issues  - comfortable SpO2 high 90s room air    ====================CARDIOVASCULAR==================  #STEMI  - s/p LHC HALLE to 80% LAD and POBA to diag 11/13  - ASA, Brilinta  - high intensity statin  - Trend CE to peak  - lopressor 25 BID  - lisinopril 5mg QD    ===================HEMATOLOGIC/ONC ===================  No active issues  - trend CBC    ===================== RENAL =========================  No active issues  - normal renal function  - trend BUN/SCr  - Strict I/Os  - keep K 4-4.5, Mg > 2    ==================== GASTROINTESTINAL===================  No active issues  - DASH diet    =======================    ENDOCRINE  =====================  No active issues  - A1C 5.8%, BG controlled  - TSH wnl    ========================INFECTIOUS DISEASE================  Leukocytosis  - likely reactive i/s/o stemi  - no infectious sx, afebrile, monitor off abx  - trend cbc and fever curve    Patient requires continuous monitoring with bedside rhythm monitoring, pulse ox monitoring. Care plan discussed with ICU care team.   Patient seen, examined and plan discussed with CCU team during rounds.     By signing my name below, I, Kiki Bartholomew, attest that this documentation has been prepared under the direction and in the presence of NANCY Maier   Electronically signed: Masha Brito, 11-12-21 @ 19:46    I, Latha Piña, personally performed the services described in this documentation. all medical record entries made by the scribe were at my direction and in my presence. I have reviewed the chart and agree that the record reflects my personal performance and is accurate and complete  Electronically signed: NANCY Maier

## 2021-11-12 NOTE — ED PROVIDER NOTE - PROGRESS NOTE DETAILS
Josi Galeas, DO PGY-3: spoke with cards - will come see the patient ok with heparin drip Josi Galeas, DO PGY-3: cards would like the patient to be admitted to cicu

## 2021-11-12 NOTE — ED ADULT NURSE NOTE - NS_SISCREENINGSR_GEN_ALL_ED

## 2021-11-12 NOTE — ED ADULT NURSE NOTE - NS ED NURSE TRANSPORT WITH
Cardiac Monitor/Defib/ACLS/Rescue Kit/O2/BVM/IV pump/ACLS Rescue Kit Cardiac Monitor/Defib/ACLS/Rescue Kit/O2/BVM/IV pump/pulse ox/ACLS Rescue Kit

## 2021-11-12 NOTE — ED PROVIDER NOTE - ATTENDING CONTRIBUTION TO CARE
Afebrile. Awake and Alert. Lungs CTA. Heart RRR. Abdomen soft NTND. CN II-XII grossly intact. Moves all extremities without lateralization.    Pt transferred in stable condition for STEMI anterior wall  Hep, Brilinta, nitro, ASA given PTA, now chest pain free  Cardiology consult

## 2021-11-12 NOTE — H&P ADULT - HISTORY OF PRESENT ILLNESS
45 y/o M no known pmh here with 4 days of chest pain which worsened today and began to radiate to both arms. Went to [facility], found to have ant wall stemi and transferred to [facility] on heparin gtt after asa/plavix load. Upon arrival s/p NTG x1 pt CP free. EKG no longer with elevations. Decision made to defer cath for now.     Currently pt feels well resting comfortably

## 2021-11-12 NOTE — ED PROVIDER NOTE - ST/T WAVE
NINFA V2, V1, aVL, STD II, III, AVF NINFA V2, V1, I, and aVL. STD II, III, and aVF No acute ischemic morphology

## 2021-11-12 NOTE — ED PROVIDER NOTE - OBJECTIVE STATEMENT
45 yo M with no knw pmhx presents with 4ds of chest pain, worsened today with associated b/l arm pain. Does not have primary care physician. Never had heart attack in past. Denies f/c/n/v/abd pain. 47 yo M with no knw pmhx presents with 4ds of chest pain, worsened today with associated b/l arm pain. Does not have primary care physician. Never had heart attack in past. Denies f/c/n/v/abd pain, cough, shortness of breath.   transfer from [facility] for STEMI - already received asa, brillinta, heparin bolus. No chest pain at this time.

## 2021-11-12 NOTE — ED PROVIDER NOTE - CLINICAL SUMMARY MEDICAL DECISION MAKING FREE TEXT BOX
Concern for STEMI. HDS at this time - continue to monitor. RPT labs, start on heparin drip. Cards. Plan to admit.

## 2021-11-12 NOTE — ED PROVIDER NOTE - PHYSICAL EXAMINATION
Gen: non toxic appearing, NAD   Head: NC/NT  Eyes:  anicteric  ENT: airway patent, mmm   CV: RRR, +S1/S2   Resp: CTAB, symmetric breath sounds   GI: abdomen soft non-distended, NTTP  Extremities - no edema  Neuro: A&Ox4

## 2021-11-12 NOTE — ED PROVIDER NOTE - NS ED MD TWO NIGHTS YN
Visit Information    Have you changed or started any medications since your last visit including any over-the-counter medicines, vitamins, or herbal medicines? no   Are you having any side effects from any of your medications? -  no  Have you stopped taking any of your medications? Is so, why? -  yes - see med list    Have you seen any other physician or provider since your last visit? Yes - Records Obtained  Have you had any other diagnostic tests since your last visit? Yes - Records Obtained  Have you been seen in the emergency room and/or had an admission to a hospital since we last saw you? Yes - Records Obtained  Have you had your routine dental cleaning in the past 6 months? yes - routine    Have you activated your [employer] account? If not, what are your barriers?  Yes     Patient Care Team:  Stephy Schultz DO as PCP - General (Family Medicine)    Medical History Review  Past Medical, Family, and Social History reviewed and does contribute to the patient presenting condition    Health Maintenance   Topic Date Due    HIV screen  04/05/2009    Chlamydia screen  04/05/2010    DTaP/Tdap/Td vaccine (1 - Tdap) 04/05/2013    Cervical cancer screen  04/05/2015    Flu vaccine (1) 09/01/2017 Yes

## 2021-11-13 DIAGNOSIS — D50.9 IRON DEFICIENCY ANEMIA, UNSPECIFIED: ICD-10-CM

## 2021-11-13 DIAGNOSIS — I24.9 ACUTE ISCHEMIC HEART DISEASE, UNSPECIFIED: ICD-10-CM

## 2021-11-13 DIAGNOSIS — R73.03 PREDIABETES: ICD-10-CM

## 2021-11-13 DIAGNOSIS — R74.01 ELEVATION OF LEVELS OF LIVER TRANSAMINASE LEVELS: ICD-10-CM

## 2021-11-13 DIAGNOSIS — I50.9 HEART FAILURE, UNSPECIFIED: ICD-10-CM

## 2021-11-13 LAB
ALBUMIN SERPL ELPH-MCNC: 4.1 G/DL — SIGNIFICANT CHANGE UP (ref 3.3–5)
ALP SERPL-CCNC: 136 U/L — HIGH (ref 40–120)
ALT FLD-CCNC: 26 U/L — SIGNIFICANT CHANGE UP (ref 10–45)
ANION GAP SERPL CALC-SCNC: 14 MMOL/L — SIGNIFICANT CHANGE UP (ref 5–17)
AST SERPL-CCNC: 88 U/L — HIGH (ref 10–40)
BASOPHILS # BLD AUTO: 0.06 K/UL — SIGNIFICANT CHANGE UP (ref 0–0.2)
BASOPHILS NFR BLD AUTO: 0.5 % — SIGNIFICANT CHANGE UP (ref 0–2)
BILIRUB SERPL-MCNC: 1.3 MG/DL — HIGH (ref 0.2–1.2)
BUN SERPL-MCNC: 12 MG/DL — SIGNIFICANT CHANGE UP (ref 7–23)
CALCIUM SERPL-MCNC: 9.1 MG/DL — SIGNIFICANT CHANGE UP (ref 8.4–10.5)
CHLORIDE SERPL-SCNC: 104 MMOL/L — SIGNIFICANT CHANGE UP (ref 96–108)
CK MB BLD-MCNC: 6.4 % — HIGH (ref 0–3.5)
CK MB CFR SERPL CALC: 39.9 NG/ML — HIGH (ref 0–6.7)
CK SERPL-CCNC: 619 U/L — HIGH (ref 30–200)
CO2 SERPL-SCNC: 21 MMOL/L — LOW (ref 22–31)
CREAT SERPL-MCNC: 1.16 MG/DL — SIGNIFICANT CHANGE UP (ref 0.5–1.3)
EOSINOPHIL # BLD AUTO: 0.13 K/UL — SIGNIFICANT CHANGE UP (ref 0–0.5)
EOSINOPHIL NFR BLD AUTO: 1.1 % — SIGNIFICANT CHANGE UP (ref 0–6)
GLUCOSE SERPL-MCNC: 115 MG/DL — HIGH (ref 70–99)
HCT VFR BLD CALC: 37.6 % — LOW (ref 39–50)
HGB BLD-MCNC: 10.7 G/DL — LOW (ref 13–17)
IMM GRANULOCYTES NFR BLD AUTO: 0.3 % — SIGNIFICANT CHANGE UP (ref 0–1.5)
LYMPHOCYTES # BLD AUTO: 2.62 K/UL — SIGNIFICANT CHANGE UP (ref 1–3.3)
LYMPHOCYTES # BLD AUTO: 21.2 % — SIGNIFICANT CHANGE UP (ref 13–44)
MAGNESIUM SERPL-MCNC: 2.2 MG/DL — SIGNIFICANT CHANGE UP (ref 1.6–2.6)
MCHC RBC-ENTMCNC: 20.1 PG — LOW (ref 27–34)
MCHC RBC-ENTMCNC: 28.5 GM/DL — LOW (ref 32–36)
MCV RBC AUTO: 70.5 FL — LOW (ref 80–100)
MONOCYTES # BLD AUTO: 0.92 K/UL — HIGH (ref 0–0.9)
MONOCYTES NFR BLD AUTO: 7.5 % — SIGNIFICANT CHANGE UP (ref 2–14)
NEUTROPHILS # BLD AUTO: 8.57 K/UL — HIGH (ref 1.8–7.4)
NEUTROPHILS NFR BLD AUTO: 69.4 % — SIGNIFICANT CHANGE UP (ref 43–77)
NRBC # BLD: 0 /100 WBCS — SIGNIFICANT CHANGE UP (ref 0–0)
PHOSPHATE SERPL-MCNC: 3.8 MG/DL — SIGNIFICANT CHANGE UP (ref 2.5–4.5)
PLATELET # BLD AUTO: 303 K/UL — SIGNIFICANT CHANGE UP (ref 150–400)
POTASSIUM SERPL-MCNC: 3.7 MMOL/L — SIGNIFICANT CHANGE UP (ref 3.5–5.3)
POTASSIUM SERPL-SCNC: 3.7 MMOL/L — SIGNIFICANT CHANGE UP (ref 3.5–5.3)
PROT SERPL-MCNC: 7 G/DL — SIGNIFICANT CHANGE UP (ref 6–8.3)
RBC # BLD: 5.33 M/UL — SIGNIFICANT CHANGE UP (ref 4.2–5.8)
RBC # FLD: 16.7 % — HIGH (ref 10.3–14.5)
SODIUM SERPL-SCNC: 139 MMOL/L — SIGNIFICANT CHANGE UP (ref 135–145)
TROPONIN T, HIGH SENSITIVITY RESULT: 2978 NG/L — HIGH (ref 0–51)
WBC # BLD: 12.34 K/UL — HIGH (ref 3.8–10.5)
WBC # FLD AUTO: 12.34 K/UL — HIGH (ref 3.8–10.5)

## 2021-11-13 PROCEDURE — 93010 ELECTROCARDIOGRAM REPORT: CPT

## 2021-11-13 PROCEDURE — 99223 1ST HOSP IP/OBS HIGH 75: CPT

## 2021-11-13 PROCEDURE — 99291 CRITICAL CARE FIRST HOUR: CPT

## 2021-11-13 RX ORDER — METOPROLOL TARTRATE 50 MG
50 TABLET ORAL DAILY
Refills: 0 | Status: DISCONTINUED | OUTPATIENT
Start: 2021-11-14 | End: 2021-11-15

## 2021-11-13 RX ORDER — SPIRONOLACTONE 25 MG/1
12.5 TABLET, FILM COATED ORAL DAILY
Refills: 0 | Status: DISCONTINUED | OUTPATIENT
Start: 2021-11-13 | End: 2021-11-15

## 2021-11-13 RX ORDER — POTASSIUM CHLORIDE 20 MEQ
40 PACKET (EA) ORAL ONCE
Refills: 0 | Status: COMPLETED | OUTPATIENT
Start: 2021-11-13 | End: 2021-11-13

## 2021-11-13 RX ADMIN — Medication 25 MILLIGRAM(S): at 18:08

## 2021-11-13 RX ADMIN — TICAGRELOR 90 MILLIGRAM(S): 90 TABLET ORAL at 18:08

## 2021-11-13 RX ADMIN — LISINOPRIL 5 MILLIGRAM(S): 2.5 TABLET ORAL at 06:05

## 2021-11-13 RX ADMIN — ATORVASTATIN CALCIUM 80 MILLIGRAM(S): 80 TABLET, FILM COATED ORAL at 21:19

## 2021-11-13 RX ADMIN — Medication 40 MILLIEQUIVALENT(S): at 06:05

## 2021-11-13 RX ADMIN — Medication 81 MILLIGRAM(S): at 11:23

## 2021-11-13 RX ADMIN — TICAGRELOR 90 MILLIGRAM(S): 90 TABLET ORAL at 06:05

## 2021-11-13 RX ADMIN — SPIRONOLACTONE 12.5 MILLIGRAM(S): 25 TABLET, FILM COATED ORAL at 18:08

## 2021-11-13 RX ADMIN — Medication 25 MILLIGRAM(S): at 06:05

## 2021-11-13 RX ADMIN — CHLORHEXIDINE GLUCONATE 1 APPLICATION(S): 213 SOLUTION TOPICAL at 06:08

## 2021-11-13 NOTE — PROGRESS NOTE ADULT - SUBJECTIVE AND OBJECTIVE BOX
PATIENT:  LOU RICCI  [id number]    CHIEF COMPLAINT:  Patient is a 46y old  Male who presents with a chief complaint of NSTEMI (13 Nov 2021 09:13)      INTERVAL HISTORYOVERNIGHT EVENTS: No acute events overnight. Patient examined at bedside this AM, with no subjective complaints. Denies CP, palpitations, SOB, n/v/d.       Review of Systems:     Constitutional: No weakness, fever, chills     Eyes: No blindness, no eye pain    ENT: No throat pain, no rhinorrhea     Neck: No pain or stiffness     Respiratory: No cough, no shortness of breath     Cardiovascular: No chest pain, no palpitations     Gastrointestinal: No abdominal or epigastric pain. No nausea, vomiting, or diarrhea     Genitourinary: No dysuria, no change in frequency, no hematuria     Neurological: No numbness or weakness      Skin: No itching, burning, rashes, or lesions     Psych: No depression or anxiety     MEDICATIONS:  MEDICATIONS  (STANDING):  aspirin enteric coated 81 milliGRAM(s) Oral daily  atorvastatin 80 milliGRAM(s) Oral at bedtime  chlorhexidine 4% Liquid 1 Application(s) Topical <User Schedule>  influenza   Vaccine 0.5 milliLiter(s) IntraMuscular once  lisinopril 5 milliGRAM(s) Oral daily  metoprolol tartrate 25 milliGRAM(s) Oral two times a day  spironolactone 12.5 milliGRAM(s) Oral daily  ticagrelor 90 milliGRAM(s) Oral every 12 hours    MEDICATIONS  (PRN):      ALLERGIES:  Allergies    No Known Allergies    Intolerances        OBJECTIVE:  ICU Vital Signs Last 24 Hrs  T(C): 36.4 (13 Nov 2021 14:35), Max: 36.9 (12 Nov 2021 19:00)  T(F): 97.6 (13 Nov 2021 14:35), Max: 98.4 (12 Nov 2021 19:00)  HR: 83 (13 Nov 2021 14:35) (82 - 126)  BP: 121/78 (13 Nov 2021 14:35) (84/52 - 160/97)  BP(mean): 71 (13 Nov 2021 12:00) (64 - 121)  ABP: --  ABP(mean): --  RR: 18 (13 Nov 2021 14:35) (13 - 30)  SpO2: 94% (13 Nov 2021 14:35) (92% - 97%)      Adult Advanced Hemodynamics Last 24 Hrs  CVP(mm Hg): --  CVP(cm H2O): --  CO: --  CI: --  PA: --  PA(mean): --  PCWP: --  SVR: --  SVRI: --  PVR: --  PVRI: --  CAPILLARY BLOOD GLUCOSE        CAPILLARY BLOOD GLUCOSE        I&O's Summary    12 Nov 2021 07:01  -  13 Nov 2021 07:00  --------------------------------------------------------  IN: 460 mL / OUT: 2640 mL / NET: -2180 mL    13 Nov 2021 07:01  -  13 Nov 2021 15:56  --------------------------------------------------------  IN: 440 mL / OUT: 350 mL / NET: 90 mL      Daily     Daily     PHYSICAL EXAMINATION:  General: WN/WD NAD  HEENT: PERRLA, EOMI, moist mucous membranes  Neurology: A&Ox3, nonfocal, VELA x 4  Respiratory: CTA B/L, normal respiratory effort, no wheezes, crackles, rales  CV: RRR, S1S2, no murmurs, rubs or gallops  Abdominal: Soft, NT, ND +BS,   Extremities: No edema, + peripheral pulses  Incisions:   Tubes:    LABS:                          10.7   12.34 )-----------( 303      ( 13 Nov 2021 04:52 )             37.6     11-13    139  |  104  |  12  ----------------------------<  115<H>  3.7   |  21<L>  |  1.16    Ca    9.1      13 Nov 2021 04:51  Phos  3.8     11-13  Mg     2.2     11-13    TPro  7.0  /  Alb  4.1  /  TBili  1.3<H>  /  DBili  x   /  AST  88<H>  /  ALT  26  /  AlkPhos  136<H>  11-13    LIVER FUNCTIONS - ( 13 Nov 2021 04:51 )  Alb: 4.1 g/dL / Pro: 7.0 g/dL / ALK PHOS: 136 U/L / ALT: 26 U/L / AST: 88 U/L / GGT: x           PT/INR - ( 12 Nov 2021 01:48 )   PT: 13.5 sec;   INR: 1.13 ratio         PTT - ( 12 Nov 2021 01:48 )  PTT:141.1 sec  Creatine Kinase, Serum: 619 U/L (11-13 @ 04:51)  CKMB Units: 39.9 ng/mL (11-13 @ 04:51)  Creatine Kinase, Serum: 806 U/L (11-12 @ 21:50)  CKMB Units: 59.9 ng/mL (11-12 @ 21:50)    CARDIAC MARKERS ( 13 Nov 2021 04:51 )  x     / x     / 619 U/L / x     / 39.9 ng/mL  CARDIAC MARKERS ( 12 Nov 2021 21:50 )  x     / x     / 806 U/L / x     / 59.9 ng/mL  CARDIAC MARKERS ( 12 Nov 2021 06:18 )  x     / x     / 524 U/L / x     / 49.0 ng/mL          TELEMETRY:     EKG:     IMAGING:

## 2021-11-13 NOTE — PROGRESS NOTE ADULT - PROBLEM SELECTOR PLAN 2
- TTE shows LVEF 40% with moderate segmental WMAs and mild diastolic dysfunction  - patient started on Spirinolactone  - continue above GDMT  - close O/P f/u as above

## 2021-11-13 NOTE — PROGRESS NOTE ADULT - SUBJECTIVE AND OBJECTIVE BOX
MEDICINE ACCEPTANCE NOTE    Ke Alcaraz M.D.  Division of Hospital Medicine  Available on TEAMS    HISTORY OF PRESENTING ILLNESS: 45 y/o M no known pmh here with 4 days of chest pain which worsened today and began to radiate to both arms. Went to [facility], found to have ant wall stemi and transferred to [facility] on heparin gtt after asa/plavix load. Upon arrival s/p NTG x1 pt CP free. EKG no longer with elevations. Decision made to defer cath for now. Currently pt feels well resting comfortably. (12 Nov 2021 06:25)    PAST MEDICAL & SURGICAL HISTORY: No known prior PMHx or PSHx.     SOCIAL HISTORY: No active toxic habits reported.    FAMILY HISTORY: No SCD.     ALLERGIES: No Known Allergies    HOME MEDICATIONS: None prior to admission.    HOSPITAL COURSE: Patient s/p LHC with HALLE to 80% pLAD and POBA to diag on 11/13. Patient CP free after LHC. Started on BB, ACE inhibitor, aldactone. Trop continues to trend up, however, CKMB downtrended. TTE shows LVEF 40% with moderate segmental WMA's and mild diastolic dysfunction.     SUBJECTIVE / ACUTE INTERVAL EVENTS: Patient seen and examined. No active complaints, including denial of CP, palpitations, SOB, LE edema.     Review of Systems  · General Symptoms: no fever; no chills  · Skin Symptoms: no rash; no itching  · Ophthalmologic Symptoms: no scleral injection L; no scleral injection R  · ENMT Symptoms: no throat pain; no dysphagia; no nasal congestion  · Respiratory and Thorax Symptoms: no cough; no wheezing; no pleuritic chest pain  · Cardiovascular Symptoms: no chest pain; no palpitations; no peripheral edema  · Gastrointestinal Symptoms: no nausea; no vomiting; no diarrhea; no constipation; no change in bowel habits; no abdominal pain  · General Genitourinary Symptoms: no flank pain L; no flank pain R; no dysuria  · Musculoskeletal Symptoms: no joint swelling; no neck pain; no back pain  · Neurological: negative  · Psychiatric: negative  · Hematology/Lymphatics: negative  · Endocrine: negative    OBJECTIVE:   Vital Signs Last 24 Hrs  T(F): 97.6 (13 Nov 2021 14:35), Max: 98.4 (12 Nov 2021 19:00)  HR: 83 (13 Nov 2021 14:35) (82 - 126)  BP: 121/78 (13 Nov 2021 14:35) (84/52 - 158/83)  RR: 18 (13 Nov 2021 14:35) (13 - 30)  SpO2: 94% (13 Nov 2021 14:35) (93% - 97%)    I&O's Summary  12 Nov 2021 07:01  -  13 Nov 2021 07:00  --------------------------------------------------------  IN: 460 mL / OUT: 2640 mL / NET: -2180 mL    13 Nov 2021 07:01  -  13 Nov 2021 18:09  --------------------------------------------------------  IN: 680 mL / OUT: 350 mL / NET: 330 mL    Physical Examination:  GEN: young man, laying in bed in NAD  PSYCH: A&Ox3, mood and affect appear appropriate   SKIN: intact, no e/o rash  NEURO: no focal neurologic deficits appreciated  EYES: PERRL, anicteric  HEAD: NC, AT  NECK: supple, no e/o elevated JVP  RESPI: no accessory muscle use, B/L air entry, CTAB   CARDIO: regular rate/rhythm, no LE edema B/L  ABD: soft, NT, ND  EXT: patient able to move all extremities spontaneously  VASC: peripheral pulses palpated    Labs:                      10.7   12.34 )-----------( 303      ( 13 Nov 2021 04:52 )             37.6     11-13  139  |  104  |  12  ----------------------------<  115<H>  3.7   |  21<L>  |  1.16    Ca    9.1      13 Nov 2021 04:51  Phos  3.8     11-13  Mg     2.2     11-13    TPro  7.0  /  Alb  4.1  /  TBili  1.3<H>  /  DBili  x   /  AST  88<H>  /  ALT  26  /  AlkPhos  136<H>  11-13    PT/INR - ( 12 Nov 2021 01:48 )   PT: 13.5 sec;   INR: 1.13 ratio    PTT - ( 12 Nov 2021 01:48 )  PTT:141.1 sec    CARDIAC MARKERS ( 13 Nov 2021 04:51 )  x     / x     / 619 U/L / x     / 39.9 ng/mL  CARDIAC MARKERS ( 12 Nov 2021 21:50 )  x     / x     / 806 U/L / x     / 59.9 ng/mL  CARDIAC MARKERS ( 12 Nov 2021 06:18 )  x     / x     / 524 U/L / x     / 49.0 ng/mL    HbA1C = 5.8  Lipid Profile (11.12.21 @ 09:07): Cholesterol, Serum: 212 mg/dL; Triglycerides, Serum: 343 mg/dL; HDL Cholesterol, Serum: 26 mg/dL; Non HDL Cholesterol: 187; LDL Cholesterol: 118  Imaging Personally Reviewed:  - TTE from 11/12 as reported: 1. Moderate segmental wall motion abnormalities. Endocardial visualization enhanced with intravenous injection of Ultrasonic Enhancing Agent (Definity). No left ventricular thrombus. The mid anterior septum, the apical anterior wall, the apical lateral wall, and the mid anterior wall are hypokinetic. The apical inferior wall, and the apical septum are akinetic. 2. Mild diastolic dysfunction (Stage I). 3. Normal right ventricular size and function.    Consultant(s) Notes Reviewed: CCU  Care Discussed with Consultants/Other Providers: BESSY Spears    CURRENT MEDICATIONS  (STANDING):  aspirin enteric coated 81 milliGRAM(s) Oral daily  atorvastatin 80 milliGRAM(s) Oral at bedtime  chlorhexidine 4% Liquid 1 Application(s) Topical <User Schedule>  influenza   Vaccine 0.5 milliLiter(s) IntraMuscular once  lisinopril 5 milliGRAM(s) Oral daily  metoprolol tartrate 25 milliGRAM(s) Oral two times a day  spironolactone 12.5 milliGRAM(s) Oral daily  ticagrelor 90 milliGRAM(s) Oral every 12 hours MEDICINE ACCEPTANCE NOTE    Ke Alcaraz M.D.  Division of Hospital Medicine  Available on TEAMS    HISTORY OF PRESENTING ILLNESS: 45 y/o M no known pmh here with 4 days of chest pain which worsened today and began to radiate to both arms. Went to [facility], found to have ant wall stemi and transferred to [facility] on heparin gtt after asa/plavix load. Upon arrival s/p NTG x1 pt CP free. EKG no longer with elevations. Decision made to defer cath for now. Currently pt feels well resting comfortably. (12 Nov 2021 06:25)    PAST MEDICAL & SURGICAL HISTORY: No known prior PMHx or PSHx.     FAMILY HISTORY: No SCD.     ALLERGIES: No Known Allergies    HOME MEDICATIONS: None prior to admission.    HOSPITAL COURSE: Patient s/p LHC with HALLE to 80% pLAD and POBA to diag on 11/13. Patient CP free after LHC. Started on BB, ACE inhibitor, aldactone. Trop continues to trend up, however, CKMB downtrended. TTE shows LVEF 40% with moderate segmental WMA's and mild diastolic dysfunction.     SUBJECTIVE / ACUTE INTERVAL EVENTS: Patient seen and examined. No active complaints, including denial of CP, palpitations, SOB, LE edema.     Review of Systems  · General Symptoms: no fever; no chills  · Skin Symptoms: no rash; no itching  · Ophthalmologic Symptoms: no scleral injection L; no scleral injection R  · ENMT Symptoms: no throat pain; no dysphagia; no nasal congestion  · Respiratory and Thorax Symptoms: no cough; no wheezing; no pleuritic chest pain  · Cardiovascular Symptoms: no chest pain; no palpitations; no peripheral edema  · Gastrointestinal Symptoms: no nausea; no vomiting; no diarrhea; no constipation; no change in bowel habits; no abdominal pain  · General Genitourinary Symptoms: no flank pain L; no flank pain R; no dysuria  · Musculoskeletal Symptoms: no joint swelling; no neck pain; no back pain  · Neurological: negative  · Psychiatric: negative  · Hematology/Lymphatics: negative  · Endocrine: negative    OBJECTIVE:   Vital Signs Last 24 Hrs  T(F): 97.6 (13 Nov 2021 14:35), Max: 98.4 (12 Nov 2021 19:00)  HR: 83 (13 Nov 2021 14:35) (82 - 126)  BP: 121/78 (13 Nov 2021 14:35) (84/52 - 158/83)  RR: 18 (13 Nov 2021 14:35) (13 - 30)  SpO2: 94% (13 Nov 2021 14:35) (93% - 97%)    I&O's Summary  12 Nov 2021 07:01  -  13 Nov 2021 07:00  --------------------------------------------------------  IN: 460 mL / OUT: 2640 mL / NET: -2180 mL    13 Nov 2021 07:01  -  13 Nov 2021 18:09  --------------------------------------------------------  IN: 680 mL / OUT: 350 mL / NET: 330 mL    Physical Examination:  GEN: young man, laying in bed in NAD  PSYCH: A&Ox3, mood and affect appear appropriate   SKIN: intact, no e/o rash  NEURO: no focal neurologic deficits appreciated  EYES: PERRL, anicteric  HEAD: NC, AT  NECK: supple, no e/o elevated JVP  RESPI: no accessory muscle use, B/L air entry, CTAB   CARDIO: regular rate/rhythm, no LE edema B/L  ABD: soft, NT, ND  EXT: patient able to move all extremities spontaneously  VASC: peripheral pulses palpated    Labs:                      10.7   12.34 )-----------( 303      ( 13 Nov 2021 04:52 )             37.6     11-13  139  |  104  |  12  ----------------------------<  115<H>  3.7   |  21<L>  |  1.16    Ca    9.1      13 Nov 2021 04:51  Phos  3.8     11-13  Mg     2.2     11-13    TPro  7.0  /  Alb  4.1  /  TBili  1.3<H>  /  DBili  x   /  AST  88<H>  /  ALT  26  /  AlkPhos  136<H>  11-13    PT/INR - ( 12 Nov 2021 01:48 )   PT: 13.5 sec;   INR: 1.13 ratio    PTT - ( 12 Nov 2021 01:48 )  PTT:141.1 sec    CARDIAC MARKERS ( 13 Nov 2021 04:51 )  x     / x     / 619 U/L / x     / 39.9 ng/mL  CARDIAC MARKERS ( 12 Nov 2021 21:50 )  x     / x     / 806 U/L / x     / 59.9 ng/mL  CARDIAC MARKERS ( 12 Nov 2021 06:18 )  x     / x     / 524 U/L / x     / 49.0 ng/mL    HbA1C = 5.8  Lipid Profile (11.12.21 @ 09:07): Cholesterol, Serum: 212 mg/dL; Triglycerides, Serum: 343 mg/dL; HDL Cholesterol, Serum: 26 mg/dL; Non HDL Cholesterol: 187; LDL Cholesterol: 118  Imaging Personally Reviewed:  - TTE from 11/12 as reported: 1. Moderate segmental wall motion abnormalities. Endocardial visualization enhanced with intravenous injection of Ultrasonic Enhancing Agent (Definity). No left ventricular thrombus. The mid anterior septum, the apical anterior wall, the apical lateral wall, and the mid anterior wall are hypokinetic. The apical inferior wall, and the apical septum are akinetic. 2. Mild diastolic dysfunction (Stage I). 3. Normal right ventricular size and function.    Consultant(s) Notes Reviewed: CCU  Care Discussed with Consultants/Other Providers: BESSY Spears    CURRENT MEDICATIONS  (STANDING):  aspirin enteric coated 81 milliGRAM(s) Oral daily  atorvastatin 80 milliGRAM(s) Oral at bedtime  chlorhexidine 4% Liquid 1 Application(s) Topical <User Schedule>  influenza   Vaccine 0.5 milliLiter(s) IntraMuscular once  lisinopril 5 milliGRAM(s) Oral daily  metoprolol tartrate 25 milliGRAM(s) Oral two times a day  spironolactone 12.5 milliGRAM(s) Oral daily  ticagrelor 90 milliGRAM(s) Oral every 12 hours

## 2021-11-13 NOTE — PROGRESS NOTE ADULT - ASSESSMENT
46yoM w/o known PMHx presented to OSH w/ 4 days of chest pain radiating to both arms: found to have anterior wall STEMI and transferred to [facility] where he is s/p LHC with HALLE to 80% pLAD and POBA to diag on 11/13. TTE shows LVEF 40% with moderate segmental WMA's and mild diastolic dysfunction.

## 2021-11-13 NOTE — CHART NOTE - NSCHARTNOTEFT_GEN_A_CORE
MAR Accept Note  Transfer to:  Medicine  Accepting Attending Physician: Dr. Haney    Assigned Room:  [address] [place] [address]    Patient seen and examined.   Labs and data reviewed.   No findings precluding transfer of service.       HPI/CICU COURSE:   Please refer to CICU transfer note for full details.     45 y/o M no known pmh here with 4 days of chest pain which worsened today and began to radiate to both arms. Went to [facility], found to have ant wall stemi and transferred to [facility] on heparin gtt after asa/plavix load. Upon arrival s/p NTG x1 pt CP free. EKG no longer with elevations. Decision made to defer cath for now.       CICU COURSE:    Patient s/p LHC with HALLE to 80% pLAD and POBA to diag on 11/13. Patient CP free after LHC. Started on BB, ACE inhibitor, aldactone. Trop continues to trend up, however, CKMB downtrended. TTE shows LVEF 40% with moderate segmental WMA's and mild diastolic dysfunction.       To-Do:    [ ] Transition lopressor to Toprol on 11/14 as tolerated  [ ] Schedule patient to follow up with Dr. Maxime Deluna on discharge   [ ] Downtrend trops  [ ] Find out if patient is covered for Brillinta prior to discharge     Jaja Cheek MD  PGY-3, Internal Medicine  MAR Spectra: [phone]

## 2021-11-13 NOTE — PROGRESS NOTE ADULT - PROBLEM SELECTOR PLAN 1
- anterior wall STEMI   - s/p LHC with HALLE to 80% pLAD and POBA to diag on 11/13  - continue ASA, Brilinta (check if covered by insurance PRIOR to discharge), statin (monitor transaminitis as below), ACE-I, and beta-blocker (changes to Toprol XL tomorrow AM)  - monitor hemodynamics  - trend troponins to peak, f/u CKMB to ensure continued downtrend  - scheduled O/P f/u for patient w/ Dr. Maxime Deluna within 7 days of discharge

## 2021-11-13 NOTE — PROGRESS NOTE ADULT - ASSESSMENT
45 y/o M no known pmh here with 4 days of chest pain which worsened today and began to radiate to both arms. Transferred from OSH for NSTEMI - 1, aVL, V2 NINFA and elevated trops. S/p LHC with HALLE to 80% pLAD and POBA to diag.       Neuro  - A+Ox3 ANA LAURA    Pulm  - resting comfortably on RA    Card  #ACS  - NINFA resolved on EKG, pt cp free  - S/p LHC with HALLE to 80% pLAD and POBA to diag.  - C/w GDMT - DAPT w/ asa brilinta, statin  - Trop 636  --> 905 --> 2142 --> 2978 --> continue to trend to peak   - CKMB peaked at 59.9   - Lopressor 25mg BID and lisinopril 5mg qday started 11/13   - Will need to transition lopressor to Toprol 50mg qday on 11/14 as tolerated  - TTE shows LVEF 40% with mod segmental WMA's and mild diastolic dysfunction  - Patient should follow up with Dr. Maxime Deluna as an outpatient     #New Onset HF   -TTE shows LVEF 40% with mod segmental WMA's and mild diastolic dysfunction  -BB and ACE-I as above  -Started aldactone 12.5mg qdaily    GI  - DASH diet    Endo  - A1c 5.8   - No active issues     Renal  - trend Cr  - K>4, Mg >2    Heme  - was on hep gtt for ACS previously   - monitor CBC    ID  - no infectious sx, afebrile, monitor off abx  - leukocytosis likely reactive, continue to monitor

## 2021-11-13 NOTE — PROGRESS NOTE ADULT - SUBJECTIVE AND OBJECTIVE BOX
This is a     Overnight events:     Vitals, Labs, imaging from past 24hrs reviewed.    Physical Exam:      A/p:  Neuro:  Resp:   CV:  Heme:  GI:  :  ID:   Renal:  Endo:    Rene Boston MD  Cardiology Fellow - PGY 4  Text or Call: [phone]  For all New Consults and Questions:  [email or web address]   Login: [email or web address]   See Attending attestation below and separate CCU note from earlier today.

## 2021-11-13 NOTE — CHART NOTE - NSCHARTNOTEFT_GEN_A_CORE
CICU Transfer Note  ---------------------------    Transfer from: CICU  Transfer to:  (X) Medicine    (X) Telemetry    (  ) RCU    (  ) Palliative    (  ) Stroke Unit    (  ) _______________  Accepting Physician: Dr. Haney       History of Present Illness:   47 y/o M no known pmh here with 4 days of chest pain which worsened today and began to radiate to both arms. Went to [facility], found to have ant wall stemi and transferred to [facility] on heparin gtt after asa/plavix load. Upon arrival s/p NTG x1 pt CP free. EKG no longer with elevations. Decision made to defer cath for now.       CICU COURSE:    Patient s/p LHC with HALLE to 80% pLAD and POBA to dia on 11/13. Patient CP free after LHC. Started on BB and ACE inhibitor. Trop continues to trend up, however, CKMB downtrended. TTE shows LVEF 40% with moderate segmental WMA's and mild diastolic dysfunction.       To-Do:    [ ] Uptitrate BB as tolerated   [ ] Downtrend trops  [ ] Find out if patient is covered for Brillinta prior to discharge     OBJECTIVE --  Vital Signs Last 24 Hrs  T(C): 36.7 (13 Nov 2021 12:00), Max: 36.9 (12 Nov 2021 19:00)  T(F): 98 (13 Nov 2021 12:00), Max: 98.4 (12 Nov 2021 19:00)  HR: 85 (13 Nov 2021 12:00) (82 - 126)  BP: 100/53 (13 Nov 2021 12:00) (84/52 - 160/97)  BP(mean): 71 (13 Nov 2021 12:00) (64 - 124)  RR: 24 (13 Nov 2021 12:00) (13 - 32)  SpO2: 95% (13 Nov 2021 12:00) (92% - 98%)    I&O's Summary    12 Nov 2021 07:01  -  13 Nov 2021 07:00  --------------------------------------------------------  IN: 460 mL / OUT: 2640 mL / NET: -2180 mL        MEDICATIONS  (STANDING):  aspirin enteric coated 81 milliGRAM(s) Oral daily  atorvastatin 80 milliGRAM(s) Oral at bedtime  chlorhexidine 4% Liquid 1 Application(s) Topical <User Schedule>  influenza   Vaccine 0.5 milliLiter(s) IntraMuscular once  lisinopril 5 milliGRAM(s) Oral daily  metoprolol tartrate 25 milliGRAM(s) Oral two times a day  ticagrelor 90 milliGRAM(s) Oral every 12 hours    MEDICATIONS  (PRN):        LABS                                            10.7                  Neurophils% (auto):   69.4   (11-13 @ 04:52):    12.34)-----------(303          Lymphocytes% (auto):  21.2                                          37.6                   Eosinphils% (auto):   1.1      Manual%: Neutrophils x    ; Lymphocytes x    ; Eosinophils x    ; Bands%: x    ; Blasts x                                    139    |  104    |  12                  Calcium: 9.1   / iCa: x      (11-13 @ 04:51)    ----------------------------<  115       Magnesium: 2.2                              3.7     |  21     |  1.16             Phosphorous: 3.8      TPro  7.0    /  Alb  4.1    /  TBili  1.3    /  DBili  x      /  AST  88     /  ALT  26     /  AlkPhos  136    13 Nov 2021 04:51            ASSESSMENT & PLAN:   47 y/o M no known pmh here with 4 days of chest pain which worsened today and began to radiate to both arms. Transferred from OSH for NSTEMI - 1, aVL, V2 NINFA and elevated trops. S/p LHC with HALLE to 80% pLAD and POBA to diag.       Neuro  - A+Ox3 ANA LAURA    Pulm  - resting comfortably on RA    Card  #ACS  - NINFA resolved on EKG, pt cp free  - S/p LHC with HALLE to 80% pLAD and POBA to diag.  - C/w GDMT - DAPT w/ asa brilinta, statin  - Trop 636  --> 905 --> 2142 --> 2978 --> continue to trend to peak   - CKMB peaked at 59.9   - Lopressor 25mg BID and lisinopril 5mg qday started 11/13   - Will transition lopressor to Toprol   - TTE shows LVEF 40% with mod segmental WMA's and mild diastolic dysfunction    GI  - DASH diet    Endo  - A1c 5.8   - No active issues     Renal  - trend Cr  - K>4, Mg >2    Heme  - was on hep gtt for ACS previously   - monitor CBC    ID  - no infectious sx, afebrile, monitor off abx  - leukocytosis likely reactive, continue to monitor CICU Transfer Note  ---------------------------    Transfer from: CICU  Transfer to:  (X) Medicine    (X) Telemetry    (  ) RCU    (  ) Palliative    (  ) Stroke Unit    (  ) _______________  Accepting Physician: Dr. Haney       History of Present Illness:   45 y/o M no known pmh here with 4 days of chest pain which worsened today and began to radiate to both arms. Went to [facility], found to have ant wall stemi and transferred to [facility] on heparin gtt after asa/plavix load. Upon arrival s/p NTG x1 pt CP free. EKG no longer with elevations. Decision made to defer cath for now.       CICU COURSE:    Patient s/p LHC with HALLE to 80% pLAD and POBA to diag on 11/13. Patient CP free after LHC. Started on BB, ACE inhibitor, aldactone. Trop continues to trend up, however, CKMB downtrended. TTE shows LVEF 40% with moderate segmental WMA's and mild diastolic dysfunction.       To-Do:    [ ] Transition lopressor to Toprol on 11/14 as tolerated  [ ] Schedule patient to follow up with Dr. Maxime Deluna on discharge   [ ] Downtrend trops  [ ] Find out if patient is covered for Brillinta prior to discharge     OBJECTIVE --  Vital Signs Last 24 Hrs  T(C): 36.7 (13 Nov 2021 12:00), Max: 36.9 (12 Nov 2021 19:00)  T(F): 98 (13 Nov 2021 12:00), Max: 98.4 (12 Nov 2021 19:00)  HR: 85 (13 Nov 2021 12:00) (82 - 126)  BP: 100/53 (13 Nov 2021 12:00) (84/52 - 160/97)  BP(mean): 71 (13 Nov 2021 12:00) (64 - 124)  RR: 24 (13 Nov 2021 12:00) (13 - 32)  SpO2: 95% (13 Nov 2021 12:00) (92% - 98%)    I&O's Summary    12 Nov 2021 07:01  -  13 Nov 2021 07:00  --------------------------------------------------------  IN: 460 mL / OUT: 2640 mL / NET: -2180 mL        MEDICATIONS  (STANDING):  aspirin enteric coated 81 milliGRAM(s) Oral daily  atorvastatin 80 milliGRAM(s) Oral at bedtime  chlorhexidine 4% Liquid 1 Application(s) Topical <User Schedule>  influenza   Vaccine 0.5 milliLiter(s) IntraMuscular once  lisinopril 5 milliGRAM(s) Oral daily  metoprolol tartrate 25 milliGRAM(s) Oral two times a day  ticagrelor 90 milliGRAM(s) Oral every 12 hours    MEDICATIONS  (PRN):        LABS                                            10.7                  Neurophils% (auto):   69.4   (11-13 @ 04:52):    12.34)-----------(303          Lymphocytes% (auto):  21.2                                          37.6                   Eosinphils% (auto):   1.1      Manual%: Neutrophils x    ; Lymphocytes x    ; Eosinophils x    ; Bands%: x    ; Blasts x                                    139    |  104    |  12                  Calcium: 9.1   / iCa: x      (11-13 @ 04:51)    ----------------------------<  115       Magnesium: 2.2                              3.7     |  21     |  1.16             Phosphorous: 3.8      TPro  7.0    /  Alb  4.1    /  TBili  1.3    /  DBili  x      /  AST  88     /  ALT  26     /  AlkPhos  136    13 Nov 2021 04:51            ASSESSMENT & PLAN:   45 y/o M no known pmh here with 4 days of chest pain which worsened today and began to radiate to both arms. Transferred from OSH for NSTEMI - 1, aVL, V2 NINFA and elevated trops. S/p LHC with HALLE to 80% pLAD and POBA to diag.       Neuro  - A+Ox3 ANA LAURA    Pulm  - resting comfortably on RA    Card  #ACS  - NINFA resolved on EKG, pt cp free  - S/p LHC with HALLE to 80% pLAD and POBA to diag.  - C/w GDMT - DAPT w/ asa brilinta, statin  - Trop 636  --> 905 --> 2142 --> 7888 --> continue to trend to peak   - CKMB peaked at 59.9   - Lopressor 25mg BID and lisinopril 5mg qday started 11/13   - Will need to transition lopressor to Toprol 50mg qday on 11/14 as tolerated  - TTE shows LVEF 40% with mod segmental WMA's and mild diastolic dysfunction  - Patient should follow up with Dr. Maxime Deluna as an outpatient     #New Onset HF   -TTE shows LVEF 40% with mod segmental WMA's and mild diastolic dysfunction  -BB and ACE-I as above  -Started aldactone 12.5mg qdaily    GI  - DASH diet    Endo  - A1c 5.8   - No active issues     Renal  - trend Cr  - K>4, Mg >2    Heme  - was on hep gtt for ACS previously   - monitor CBC    ID  - no infectious sx, afebrile, monitor off abx  - leukocytosis likely reactive, continue to monitor

## 2021-11-14 ENCOUNTER — TRANSCRIPTION ENCOUNTER (OUTPATIENT)
Age: 46
End: 2021-11-14

## 2021-11-14 LAB
ALBUMIN SERPL ELPH-MCNC: 4.2 G/DL — SIGNIFICANT CHANGE UP (ref 3.3–5)
ALP SERPL-CCNC: 135 U/L — HIGH (ref 40–120)
ALT FLD-CCNC: 20 U/L — SIGNIFICANT CHANGE UP (ref 10–45)
ANION GAP SERPL CALC-SCNC: 14 MMOL/L — SIGNIFICANT CHANGE UP (ref 5–17)
APPEARANCE UR: CLEAR — SIGNIFICANT CHANGE UP
AST SERPL-CCNC: 34 U/L — SIGNIFICANT CHANGE UP (ref 10–40)
BILIRUB SERPL-MCNC: 1.2 MG/DL — SIGNIFICANT CHANGE UP (ref 0.2–1.2)
BILIRUB UR-MCNC: NEGATIVE — SIGNIFICANT CHANGE UP
BUN SERPL-MCNC: 22 MG/DL — SIGNIFICANT CHANGE UP (ref 7–23)
CALCIUM SERPL-MCNC: 9.1 MG/DL — SIGNIFICANT CHANGE UP (ref 8.4–10.5)
CHLORIDE SERPL-SCNC: 105 MMOL/L — SIGNIFICANT CHANGE UP (ref 96–108)
CK MB BLD-MCNC: 3.4 % — SIGNIFICANT CHANGE UP (ref 0–3.5)
CK MB CFR SERPL CALC: 6.7 NG/ML — SIGNIFICANT CHANGE UP (ref 0–6.7)
CK SERPL-CCNC: 198 U/L — SIGNIFICANT CHANGE UP (ref 30–200)
CO2 SERPL-SCNC: 19 MMOL/L — LOW (ref 22–31)
COLOR SPEC: YELLOW — SIGNIFICANT CHANGE UP
CREAT ?TM UR-MCNC: 227 MG/DL — SIGNIFICANT CHANGE UP
CREAT ?TM UR-MCNC: 229 MG/DL — SIGNIFICANT CHANGE UP
CREAT SERPL-MCNC: 1.32 MG/DL — HIGH (ref 0.5–1.3)
DIFF PNL FLD: NEGATIVE — SIGNIFICANT CHANGE UP
FERRITIN SERPL-MCNC: 16 NG/ML — LOW (ref 30–400)
GLUCOSE SERPL-MCNC: 97 MG/DL — SIGNIFICANT CHANGE UP (ref 70–99)
GLUCOSE UR QL: NEGATIVE — SIGNIFICANT CHANGE UP
HCT VFR BLD CALC: 38.3 % — LOW (ref 39–50)
HGB BLD-MCNC: 11 G/DL — LOW (ref 13–17)
IRON SATN MFR SERPL: 30 UG/DL — LOW (ref 45–165)
IRON SATN MFR SERPL: 7 % — LOW (ref 16–55)
KETONES UR-MCNC: NEGATIVE — SIGNIFICANT CHANGE UP
LEUKOCYTE ESTERASE UR-ACNC: NEGATIVE — SIGNIFICANT CHANGE UP
MCHC RBC-ENTMCNC: 20.1 PG — LOW (ref 27–34)
MCHC RBC-ENTMCNC: 28.7 GM/DL — LOW (ref 32–36)
MCV RBC AUTO: 70.1 FL — LOW (ref 80–100)
NITRITE UR-MCNC: NEGATIVE — SIGNIFICANT CHANGE UP
NRBC # BLD: 0 /100 WBCS — SIGNIFICANT CHANGE UP (ref 0–0)
PH UR: 5.5 — SIGNIFICANT CHANGE UP (ref 5–8)
PLATELET # BLD AUTO: 325 K/UL — SIGNIFICANT CHANGE UP (ref 150–400)
POTASSIUM SERPL-MCNC: 3.6 MMOL/L — SIGNIFICANT CHANGE UP (ref 3.5–5.3)
POTASSIUM SERPL-SCNC: 3.6 MMOL/L — SIGNIFICANT CHANGE UP (ref 3.5–5.3)
PROT ?TM UR-MCNC: 13 MG/DL — HIGH (ref 0–12)
PROT SERPL-MCNC: 7.2 G/DL — SIGNIFICANT CHANGE UP (ref 6–8.3)
PROT UR-MCNC: NEGATIVE — SIGNIFICANT CHANGE UP
PROT/CREAT UR-RTO: 0.1 RATIO — SIGNIFICANT CHANGE UP (ref 0–0.2)
RBC # BLD: 5.46 M/UL — SIGNIFICANT CHANGE UP (ref 4.2–5.8)
RBC # FLD: 17.1 % — HIGH (ref 10.3–14.5)
SODIUM SERPL-SCNC: 138 MMOL/L — SIGNIFICANT CHANGE UP (ref 135–145)
SODIUM UR-SCNC: 23 MMOL/L — SIGNIFICANT CHANGE UP
SP GR SPEC: 1.02 — SIGNIFICANT CHANGE UP (ref 1.01–1.02)
TIBC SERPL-MCNC: 448 UG/DL — HIGH (ref 220–430)
TRANSFERRIN SERPL-MCNC: 389 MG/DL — HIGH (ref 200–360)
TROPONIN T, HIGH SENSITIVITY RESULT: 4276 NG/L — HIGH (ref 0–51)
UIBC SERPL-MCNC: 418 UG/DL — HIGH (ref 110–370)
UROBILINOGEN FLD QL: NEGATIVE — SIGNIFICANT CHANGE UP
UUN UR-MCNC: 1048 MG/DL — SIGNIFICANT CHANGE UP
WBC # BLD: 11.14 K/UL — HIGH (ref 3.8–10.5)
WBC # FLD AUTO: 11.14 K/UL — HIGH (ref 3.8–10.5)

## 2021-11-14 PROCEDURE — 99233 SBSQ HOSP IP/OBS HIGH 50: CPT

## 2021-11-14 RX ORDER — IRON SUCROSE 20 MG/ML
200 INJECTION, SOLUTION INTRAVENOUS EVERY 24 HOURS
Refills: 0 | Status: DISCONTINUED | OUTPATIENT
Start: 2021-11-14 | End: 2021-11-15

## 2021-11-14 RX ORDER — POTASSIUM CHLORIDE 20 MEQ
40 PACKET (EA) ORAL ONCE
Refills: 0 | Status: COMPLETED | OUTPATIENT
Start: 2021-11-14 | End: 2021-11-14

## 2021-11-14 RX ADMIN — SPIRONOLACTONE 12.5 MILLIGRAM(S): 25 TABLET, FILM COATED ORAL at 05:49

## 2021-11-14 RX ADMIN — Medication 40 MILLIEQUIVALENT(S): at 09:06

## 2021-11-14 RX ADMIN — IRON SUCROSE 110 MILLIGRAM(S): 20 INJECTION, SOLUTION INTRAVENOUS at 18:40

## 2021-11-14 RX ADMIN — TICAGRELOR 90 MILLIGRAM(S): 90 TABLET ORAL at 17:35

## 2021-11-14 RX ADMIN — CHLORHEXIDINE GLUCONATE 1 APPLICATION(S): 213 SOLUTION TOPICAL at 08:57

## 2021-11-14 RX ADMIN — Medication 81 MILLIGRAM(S): at 13:05

## 2021-11-14 RX ADMIN — Medication 50 MILLIGRAM(S): at 05:50

## 2021-11-14 RX ADMIN — ATORVASTATIN CALCIUM 80 MILLIGRAM(S): 80 TABLET, FILM COATED ORAL at 21:11

## 2021-11-14 RX ADMIN — LISINOPRIL 5 MILLIGRAM(S): 2.5 TABLET ORAL at 05:50

## 2021-11-14 RX ADMIN — TICAGRELOR 90 MILLIGRAM(S): 90 TABLET ORAL at 05:49

## 2021-11-14 NOTE — PROGRESS NOTE ADULT - PROBLEM SELECTOR PLAN 1
- anterior wall STEMI   - s/p LHC with HALLE to 80% pLAD and POBA to diag on 11/13  - continue ASA, Brilinta (check if covered by insurance PRIOR to discharge - it is covered), statin (monitor transaminitis as below), ACE-I, and beta-blocker ( Toprol XL )  - monitor hemodynamics  - trend troponins to peak - still uptrending, f/u CKMB to ensure continued downtrend  - scheduled O/P f/u for patient w/ Dr. Maxime Deluna within 7 days of discharge  -Will repeat echocardiogram at 72 hour mary jo to r/o LV thrombus.   -Smoking cessation counseling provided. - anterior wall STEMI   - s/p LHC with HALLE to 80% pLAD and POBA to diag on 11/13  - continue ASA, Brilinta (check if covered by insurance PRIOR to discharge - it is covered), statin (monitor transaminitis as below), ACE-I, and beta-blocker ( Toprol XL )  - monitor hemodynamics  - trend troponins to peak - still uptrending, f/u CKMB to ensure continued downtrend  - scheduled O/P f/u for patient w/ Dr. Maxime Deluna within 7 days of discharge  -Will repeat echocardiogram at 72 hour mary jo to r/o LV thrombus.   -Smoking cessation counseling provided. -Patient would like nicotine patches for DC to help him quit when home.

## 2021-11-14 NOTE — PROGRESS NOTE ADULT - SUBJECTIVE AND OBJECTIVE BOX
Patient is a 46y old  Male who presents with a chief complaint of NSTEMI (2021 15:32)        SUBJECTIVE / OVERNIGHT EVENTS: Patient denies CP or SOB or N/V.       MEDICATIONS  (STANDING):  aspirin enteric coated 81 milliGRAM(s) Oral daily  atorvastatin 80 milliGRAM(s) Oral at bedtime  chlorhexidine 4% Liquid 1 Application(s) Topical <User Schedule>  influenza   Vaccine 0.5 milliLiter(s) IntraMuscular once  iron sucrose IVPB 200 milliGRAM(s) IV Intermittent every 24 hours  lisinopril 5 milliGRAM(s) Oral daily  metoprolol succinate ER 50 milliGRAM(s) Oral daily  spironolactone 12.5 milliGRAM(s) Oral daily  ticagrelor 90 milliGRAM(s) Oral every 12 hours    MEDICATIONS  (PRN):      Vital Signs Last 24 Hrs  T(C): 36.4 (2021 12:41), Max: 37.1 (2021 20:42)  T(F): 97.6 (2021 12:41), Max: 98.7 (2021 20:42)  HR: 86 (2021 12:41) (76 - 90)  BP: 121/81 (2021 12:41) (110/74 - 121/81)  BP(mean): --  RR: 16 (2021 12:41) (16 - 18)  SpO2: 99% (2021 12:41) (97% - 99%)  CAPILLARY BLOOD GLUCOSE        I&O's Summary    :  -  2021 07:00  --------------------------------------------------------  IN: 680 mL / OUT: 350 mL / NET: 330 mL    2021 07:01  -  2021 20:02  --------------------------------------------------------  IN: 480 mL / OUT: 0 mL / NET: 480 mL          PHYSICAL EXAM:   GENERAL: NAD, well-developed  HEAD:  Atraumatic, Normocephalic  EYES:  conjunctiva and sclera clear  NECK: Supple,   CHEST/LUNG: Clear to auscultation bilaterally distant bs; No wheeze  HEART: S1S2 normal. Regular rate and rhythm; No murmurs, rubs, or gallops  ABDOMEN: Soft, obese, Nontender, Nondistended; Bowel sounds present  EXTREMITIES:  No clubbing, cyanosis, or edema  PSYCH/Neuro: AAOx3. Non-focal.   SKIN: No rashes or lesions      LABS:                        11.0   11.14 )-----------( 325      ( 2021 06:51 )             38.3     -14    138  |  105  |  22  ----------------------------<  97  3.6   |  19<L>  |  1.32<H>    Ca    9.1      2021 06:52  Phos  3.8     11-13  Mg     2.2     11-13    TPro  7.2  /  Alb  4.2  /  TBili  1.2  /  DBili  x   /  AST  34  /  ALT  20  /  AlkPhos  135<H>  11-14      CARDIAC MARKERS ( 2021 06:52 )  x     / x     / 198 U/L / x     / 6.7 ng/mL  CARDIAC MARKERS ( 2021 04:51 )  x     / x     / 619 U/L / x     / 39.9 ng/mL  CARDIAC MARKERS ( 2021 21:50 )  x     / x     / 806 U/L / x     / 59.9 ng/mL      Urinalysis Basic - ( 2021 12:00 )    Color: Yellow / Appearance: Clear / S.019 / pH: x  Gluc: x / Ketone: Negative  / Bili: Negative / Urobili: Negative   Blood: x / Protein: Negative / Nitrite: Negative   Leuk Esterase: Negative / RBC: x / WBC x   Sq Epi: x / Non Sq Epi: x / Bacteria: x    e< from: TTE with Doppler (w/Cont) (21 @ 08:15) >  Conclusions:  1. Moderate segmental wall motion abnormalities.  Endocardial visualization enhanced with intravenous  injection of Ultrasonic Enhancing Agent (Definity). No left  ventricular thrombus. The mid anterior septum, the apical  anterior wall, the apical lateral wall, and the mid  anterior wall are hypokinetic. The apical inferior wall,  and the apical septum are akinetic.  2. Mild diastolic dysfunction (Stage I).  3. Normal right ventricular size and function.  *** No previous Echo exam.    < end of copied text >      Telemetry reviewed: Sinus 70-80's.     RADIOLOGY & ADDITIONAL TESTS:    Imaging Personally Reviewed: echo report  Consultant(s) Notes Reviewed:  Cardiology  Care Discussed with Consultants/Other Providers:

## 2021-11-14 NOTE — PROGRESS NOTE ADULT - ASSESSMENT
46yoM w/o known PMHx presented to OSH w/ 4 days of chest pain radiating to both arms: found to have anterior wall STEMI and transferred to [facility] where he is s/p LHC with HALLE to 80% pLAD and POBA to diag on 11/13. TTE shows LVEF 40% with moderate segmental WMA's and mild diastolic dysfunction.

## 2021-11-14 NOTE — DISCHARGE NOTE PROVIDER - PROVIDER TOKENS
FREE:[LAST:[your PCP],PHONE:[(   )    -],FAX:[(   )    -]] PROVIDER:[TOKEN:[[id number]],FOLLOWUP:[1 week]]

## 2021-11-14 NOTE — DISCHARGE NOTE PROVIDER - CARE PROVIDER_API CALL
your PCP,   Phone: (   )    -  Fax: (   )    -  Follow Up Time:    Bob Poe J  CARDIOVASCULAR DISEASE  [address] [place] [id number]  Phone: [phone]  Fax: [phone]  Follow Up Time: 1 week

## 2021-11-14 NOTE — PROGRESS NOTE ADULT - PROBLEM SELECTOR PLAN 3
- mildly uptrending AST/ALT  - monitor on high intensity statin - mildly uptrending AST/ALT - likely from cardiac muscle.   - monitor on high intensity statin  -Mild ORTEGA noted. Trend Cr. -F/u urine studies.

## 2021-11-14 NOTE — DISCHARGE NOTE PROVIDER - HOSPITAL COURSE
46yoM w/o known PMHx presented to OSH w/ 4 days of chest pain radiating to both arms: found to have anterior wall STEMI and transferred to [facility] where he is s/p LHC with HALLE to 80% pLAD and POBA to diag on 11/13. TTE shows LVEF 40% with moderate segmental WMA's and mild diastolic dysfunction. Cardiac meds adjusted. Careful monitoring of transaminitis, which is improving. Trop down-trending. Repeat echo stable. Patient remains stable for DC with close follow up with PCP and Cards. 46yoM w/o known PMHx presented to OSH w/ 4 days of chest pain radiating to both arms: found to have anterior wall STEMI and transferred to [facility] where he is s/p LHC with HALLE to 80% pLAD and POBA to diag on 11/13. TTE shows LVEF 40% with moderate segmental WMA's and mild diastolic dysfunction. Cardiac meds adjusted. Careful monitoring of transaminitis, which is improving. Trop down-trending. Repeat echo stable. Anemia workup done with iron deficiency anemia. s/p IV Iron. Patient remains stable for DC with close follow up with PCP and Cards. 46yoM w/o known PMHx presented to OSH w/ 4 days of chest pain radiating to both arms: found to have anterior wall STEMI and transferred to CCU [facility] where he is s/p LHC with HALLE to 80% pLAD and POBA to diag on 11/13. TTE shows LVEF 40% with moderate segmental WMA's and mild diastolic dysfunction. Cardiac meds adjusted. Careful monitoring of transaminitis, which is improving. Trop down-trending. Repeat echo stable. Anemia workup done with iron deficiency anemia. s/p IV Iron and will c/w PO iron. Patient remains stable for DC with close follow up with PCP and Cards. 46yoM w/o known PMHx presented to OSH w/ 4 days of chest pain radiating to both arms: found to have anterior wall STEMI and transferred to CCU [facility] where he is s/p LHC with HALLE to 80% pLAD and POBA to diag on 11/13. TTE shows LVEF 40% with moderate segmental WMA's and mild diastolic dysfunction. Cardiac meds adjusted. Careful monitoring of transaminitis, which is improving. Trop down-trending. Repeat echo w/o thrombus. Anemia workup done with iron deficiency anemia. s/p IV Iron and will c/w PO iron. Patient remains stable for DC with close follow up with PCP and Cards.

## 2021-11-14 NOTE — PROGRESS NOTE ADULT - PROBLEM SELECTOR PLAN 4
- HbA1C = 5.8  - lifestyle and dietary modifications encouraged  - BMI > 35 - HbA1C = 5.8  - lifestyle and dietary modifications encouraged  - BMI > 35  -RD eval.  -DASH diet.

## 2021-11-14 NOTE — DISCHARGE NOTE PROVIDER - CARE PROVIDERS DIRECT ADDRESSES
,DirectAddress_Unknown ,[email or web address]

## 2021-11-14 NOTE — PROGRESS NOTE ADULT - SUBJECTIVE AND OBJECTIVE BOX
CARDIOLOGY FELLOW PROGRESS NOTE    Subjective:    No acute events overnight.   ROS negative.     Current Medications:   aspirin enteric coated 81 milliGRAM(s) Oral daily  atorvastatin 80 milliGRAM(s) Oral at bedtime  chlorhexidine 4% Liquid 1 Application(s) Topical <User Schedule>  influenza   Vaccine 0.5 milliLiter(s) IntraMuscular once  lisinopril 5 milliGRAM(s) Oral daily  metoprolol succinate ER 50 milliGRAM(s) Oral daily  spironolactone 12.5 milliGRAM(s) Oral daily  ticagrelor 90 milliGRAM(s) Oral every 12 hours    Physical Exam:  T(F): 97.4 (11-14), Max: 98.7 (11-13)  HR: 90 (11-14) (76 - 90)  BP: 110/74 (11-14) (110/74 - 121/78)  BP(mean): --  ABP: --  ABP(mean): --  RR: 18 (11-14)  SpO2: 97% (11-14)  GENERAL: No acute distress, well-developed  HEAD:  Atraumatic, Normocephalic  ENT: EOMI, PERRLA, conjunctiva and sclera clear, Neck supple, No JVD, moist mucosa  CHEST/LUNG: Clear to auscultation bilaterally; No wheeze, equal breath sounds bilaterally   BACK: No spinal tenderness  HEART: Regular rate and rhythm; No murmurs, rubs, or gallops  ABDOMEN: Soft, Nontender, Nondistended; Bowel sounds present  EXTREMITIES:  No clubbing, cyanosis, or edema  PSYCH: Nl behavior, nl affect  NEUROLOGY: AAOx3, non-focal, cranial nerves intact  SKIN: Normal color, No rashes or lesions    Cardiovascular Diagnostic Testing: personally reviewed    CXR: Personally reviewed    Labs: Personally reviewed                        11.0   11.14 )-----------( 325      ( 14 Nov 2021 06:51 )             38.3     11-14    138  |  105  |  22  ----------------------------<  97  3.6   |  19<L>  |  1.32<H>    Ca    9.1      14 Nov 2021 06:52  Phos  3.8     11-13  Mg     2.2     11-13    TPro  7.2  /  Alb  4.2  /  TBili  1.2  /  DBili  x   /  AST  34  /  ALT  20  /  AlkPhos  135<H>  11-14        CARDIAC MARKERS ( 14 Nov 2021 06:52 )  4276 ng/L / x     / x     / 198 U/L / x     / 6.7 ng/mL  CARDIAC MARKERS ( 13 Nov 2021 04:51 )  2978 ng/L / x     / x     / 619 U/L / x     / 39.9 ng/mL  CARDIAC MARKERS ( 12 Nov 2021 21:50 )  2142 ng/L / x     / x     / 806 U/L / x     / 59.9 ng/mL  CARDIAC MARKERS ( 12 Nov 2021 06:18 )  905 ng/L / x     / x     / 524 U/L / x     / 49.0 ng/mL  CARDIAC MARKERS ( 12 Nov 2021 01:48 )  636 ng/L / x     / x     / x     / x     / x            Serum Pro-Brain Natriuretic Peptide: 708 pg/mL (11-12 @ 01:48)        Thyroid Stimulating Hormone, Serum: 1.25 uIU/mL (11-12 @ 08:58)     CARDIOLOGY FELLOW PROGRESS NOTE    Subjective:  No acute events overnight.   ROS negative.     Current Medications:   aspirin enteric coated 81 milliGRAM(s) Oral daily  atorvastatin 80 milliGRAM(s) Oral at bedtime  chlorhexidine 4% Liquid 1 Application(s) Topical <User Schedule>  influenza   Vaccine 0.5 milliLiter(s) IntraMuscular once  lisinopril 5 milliGRAM(s) Oral daily  metoprolol succinate ER 50 milliGRAM(s) Oral daily  spironolactone 12.5 milliGRAM(s) Oral daily  ticagrelor 90 milliGRAM(s) Oral every 12 hours    Physical Exam:  T(F): 97.4 (11-14), Max: 98.7 (11-13)  HR: 90 (11-14) (76 - 90)  BP: 110/74 (11-14) (110/74 - 121/78)  BP(mean): --  ABP: --  ABP(mean): --  RR: 18 (11-14)  SpO2: 97% (11-14)  GENERAL: No acute distress, well-developed  HEAD:  Atraumatic, Normocephalic  ENT: EOMI, PERRLA, conjunctiva and sclera clear, Neck supple, No JVD, moist mucosa  CHEST/LUNG: Clear to auscultation bilaterally; No wheeze, equal breath sounds bilaterally   BACK: No spinal tenderness  HEART: Regular rate and rhythm; No murmurs, rubs, or gallops  ABDOMEN: Soft, Nontender, Nondistended; Bowel sounds present  EXTREMITIES:  No clubbing, cyanosis, or edema  PSYCH: Nl behavior, nl affect  NEUROLOGY: AAOx3, non-focal, cranial nerves intact  SKIN: Normal color, No rashes or lesions    Cardiovascular Diagnostic Testing: personally reviewed    CXR: Personally reviewed    Labs: Personally reviewed                        11.0   11.14 )-----------( 325      ( 14 Nov 2021 06:51 )             38.3     11-14    138  |  105  |  22  ----------------------------<  97  3.6   |  19<L>  |  1.32<H>    Ca    9.1      14 Nov 2021 06:52  Phos  3.8     11-13  Mg     2.2     11-13    TPro  7.2  /  Alb  4.2  /  TBili  1.2  /  DBili  x   /  AST  34  /  ALT  20  /  AlkPhos  135<H>  11-14        CARDIAC MARKERS ( 14 Nov 2021 06:52 )  4276 ng/L / x     / x     / 198 U/L / x     / 6.7 ng/mL  CARDIAC MARKERS ( 13 Nov 2021 04:51 )  2978 ng/L / x     / x     / 619 U/L / x     / 39.9 ng/mL  CARDIAC MARKERS ( 12 Nov 2021 21:50 )  2142 ng/L / x     / x     / 806 U/L / x     / 59.9 ng/mL  CARDIAC MARKERS ( 12 Nov 2021 06:18 )  905 ng/L / x     / x     / 524 U/L / x     / 49.0 ng/mL  CARDIAC MARKERS ( 12 Nov 2021 01:48 )  636 ng/L / x     / x     / x     / x     / x            Serum Pro-Brain Natriuretic Peptide: 708 pg/mL (11-12 @ 01:48)        Thyroid Stimulating Hormone, Serum: 1.25 uIU/mL (11-12 @ 08:58)

## 2021-11-14 NOTE — PROGRESS NOTE ADULT - PROBLEM SELECTOR PLAN 5
- stable anemia during hospitalization  - send iron studies in AM - stable anemia during hospitalization  - Iron studies consistent with LAURA. -Will start IV Venofer 200mg daily x 5 days (does not need to complete all doses) and then can go on oral ferrous sulfate 325mg daily. -Will check FOBT.     6. Discussed plan with NANCY Spears.

## 2021-11-14 NOTE — DISCHARGE NOTE PROVIDER - NSDCMRMEDTOKEN_GEN_ALL_CORE_FT
ticagrelor 90 mg oral tablet: 1 tab(s) orally every 12 hours   aspirin 81 mg oral delayed release tablet: 1 tab(s) orally once a day  atorvastatin 80 mg oral tablet: 1 tab(s) orally once a day (at bedtime)  FeroSul 325 mg (65 mg elemental iron) oral tablet: 1 tab(s) orally once a day   lisinopril 5 mg oral tablet: 1 tab(s) orally once a day  metoprolol succinate 50 mg oral tablet, extended release: 1 tab(s) orally once a day  spironolactone 25 mg oral tablet: 0.5 tab(s) orally once a day  ticagrelor 90 mg oral tablet: 1 tab(s) orally every 12 hours

## 2021-11-14 NOTE — DISCHARGE NOTE PROVIDER - NSDCCPCAREPLAN_GEN_ALL_CORE_FT
PRINCIPAL DISCHARGE DIAGNOSIS  Diagnosis: STEMI (ST elevation myocardial infarction)  Assessment and Plan of Treatment: Coronary arteries are the blood vessels that supply your heart. You underwent a procedure in which a catheter was inserted into your blood vessels and stents were placed to keep your coronary arteries open. This is important because clogged coronary arteries are the cause of heart attacks. Please continue to take aspirin and Brilinta for as long as your cardiologist recommends as these will ensure no clots develop in your stents.        SECONDARY DISCHARGE DIAGNOSES  Diagnosis: Pre-diabetes  Assessment and Plan of Treatment: A prediabetes diagnosis can be alarming. This condition is marked by abnormally high blood sugar (glucose) most often due to insulin resistance. This is a condition in which the body doesn’t use insulin properly. It’s often a precursor to type 2 diabetes. With prediabetes, you may also be at risk of developing cardiovascular disease. However, a prediabetes diagnosis doesn’t mean you will definitely get type 2 diabetes. The key is early intervention; to get your blood sugar out of the prediabetes range. Your diet is important, and you need to know the right kind of foods to eat. A diet that can help to lose weight and manage prediabetes will normally include foods that are:  low in fat  low in calories  high in fiber  -  Please eat plenty of vegetables, whole grains, lean meats, protein-packed legumes. Please be careful to avoid added sugars. Fruits contain sugar, but they also provide fiber and other nutrients. For this reason, a person can include a limited amount of fruit in their diet.  -  Your hemoglobin A1C level was 5.8. Please check your Hgb A1c level every 3 months to monitor.  You can’t control all risk factors for prediabetes, but some can be mitigated. Lifestyle changes can help you maintain balanced blood sugar levels as well as a healthy weight.      Diagnosis: New onset of congestive heart failure  Assessment and Plan of Treatment: Weigh yourself daily.  If you gain 3lbs in 3 days, or 5lbs in a week call your Health Care Provider.  Do not eat or drink foods containing more than 2000mg of salt (sodium) in your diet every day.  Call your Health Care Provider if you have any swelling or increased swelling in your feet, ankles, and/or stomach.  Take all of your medication as directed.  If you become dizzy call your Health Care Provider.      Diagnosis: ACS (acute coronary syndrome)  Assessment and Plan of Treatment: Cardiac catheterization or coronary angiogram is done to understand how the heart is working and to look at the coronary arteries which supply oxygen to the heart muscles, to look at the heart chambers and the valves in the heart.  The doctor uses your groin or your arm to do the procedure  You will need to see your doctor within one week after discharge  Call your doctor if the insertion site bleeds a lot, if you get a fever or have pain, swelling, or redness where the tube went in, or if your leg feels weak, numb, or cold.  No heavy lifting, strenuous activity, bending, straining or unnecessary stair climbing  for 2 weeks. No sex for 1 week, and no driving for 2 days after cath is recommeded. You may shower 24 hours following procedure but avoid baths and swimming for 1 week. Check the site for bleeding and/or swelling daily following procedure. Call your doctor/cardiologist immediately should it occur or if you have increased/persistent pain at the site. Follow up with your cardiologist in 1- 2 weeks. You may call [facility] at [phone] or [phone] after office hours and weekends  with any questions or concerns following your procedure. Take medications as prescribed.      Diagnosis: Transaminitis  Assessment and Plan of Treatment: Resolving. Please follow up with PCP to trend your liver enzymes elevation     PRINCIPAL DISCHARGE DIAGNOSIS  Diagnosis: STEMI (ST elevation myocardial infarction)  Assessment and Plan of Treatment: Coronary arteries are the blood vessels that supply your heart. You underwent a procedure in which a catheter was inserted into your blood vessels and stents were placed to keep your coronary arteries open. This is important because clogged coronary arteries are the cause of heart attacks. Please continue to take aspirin and Brilinta for as long as your cardiologist recommends as these will ensure no clots develop in your stents.        SECONDARY DISCHARGE DIAGNOSES  Diagnosis: Pre-diabetes  Assessment and Plan of Treatment: A prediabetes diagnosis can be alarming. This condition is marked by abnormally high blood sugar (glucose) most often due to insulin resistance. This is a condition in which the body doesn’t use insulin properly. It’s often a precursor to type 2 diabetes. With prediabetes, you may also be at risk of developing cardiovascular disease. However, a prediabetes diagnosis doesn’t mean you will definitely get type 2 diabetes. The key is early intervention; to get your blood sugar out of the prediabetes range. Your diet is important, and you need to know the right kind of foods to eat. A diet that can help to lose weight and manage prediabetes will normally include foods that are:  low in fat  low in calories  high in fiber  -  Please eat plenty of vegetables, whole grains, lean meats, protein-packed legumes. Please be careful to avoid added sugars. Fruits contain sugar, but they also provide fiber and other nutrients. For this reason, a person can include a limited amount of fruit in their diet.  -  Your hemoglobin A1C level was 5.8. Please check your Hgb A1c level every 3 months to monitor.  You can’t control all risk factors for prediabetes, but some can be mitigated. Lifestyle changes can help you maintain balanced blood sugar levels as well as a healthy weight.      Diagnosis: New onset of congestive heart failure  Assessment and Plan of Treatment: Weigh yourself daily.  If you gain 3lbs in 3 days, or 5lbs in a week call your Health Care Provider.  Do not eat or drink foods containing more than 2000mg of salt (sodium) in your diet every day.  Call your Health Care Provider if you have any swelling or increased swelling in your feet, ankles, and/or stomach.  Take all of your medication as directed.  If you become dizzy call your Health Care Provider.      Diagnosis: ACS (acute coronary syndrome)  Assessment and Plan of Treatment: Cardiac catheterization or coronary angiogram is done to understand how the heart is working and to look at the coronary arteries which supply oxygen to the heart muscles, to look at the heart chambers and the valves in the heart.  The doctor uses your groin or your arm to do the procedure  You will need to see your doctor within one week after discharge  Call your doctor if the insertion site bleeds a lot, if you get a fever or have pain, swelling, or redness where the tube went in, or if your leg feels weak, numb, or cold.  No heavy lifting, strenuous activity, bending, straining or unnecessary stair climbing  for 2 weeks. No sex for 1 week, and no driving for 2 days after cath is recommeded. You may shower 24 hours following procedure but avoid baths and swimming for 1 week. Check the site for bleeding and/or swelling daily following procedure. Call your doctor/cardiologist immediately should it occur or if you have increased/persistent pain at the site. Follow up with your cardiologist in 1- 2 weeks. You may call [facility] at [phone] or [phone] after office hours and weekends  with any questions or concerns following your procedure. Take medications as prescribed.      Diagnosis: Transaminitis  Assessment and Plan of Treatment: Resolving. Please follow up with PCP to trend your liver enzymes elevation    Diagnosis: Anemia, iron deficiency  Assessment and Plan of Treatment: Symptoms to report, bleeding, palpitations, fatigue, pale skin, cold skin, dizziness. Take medications as ordered by PCP       PRINCIPAL DISCHARGE DIAGNOSIS  Diagnosis: STEMI (ST elevation myocardial infarction)  Assessment and Plan of Treatment: Coronary arteries are the blood vessels that supply your heart. You underwent a procedure in which a catheter was inserted into your blood vessels and stents were placed to keep your coronary arteries open. This is important because clogged coronary arteries are the cause of heart attacks. Please continue to take aspirin and Brilinta for as long as your cardiologist recommends as these will ensure no clots develop in your stents.        SECONDARY DISCHARGE DIAGNOSES  Diagnosis: Transaminitis  Assessment and Plan of Treatment: Resolving. Please follow up with PCP to trend your liver enzymes elevation    Diagnosis: Pre-diabetes  Assessment and Plan of Treatment: A prediabetes diagnosis can be alarming. This condition is marked by abnormally high blood sugar (glucose) most often due to insulin resistance. This is a condition in which the body doesn’t use insulin properly. It’s often a precursor to type 2 diabetes. With prediabetes, you may also be at risk of developing cardiovascular disease. However, a prediabetes diagnosis doesn’t mean you will definitely get type 2 diabetes. The key is early intervention; to get your blood sugar out of the prediabetes range. Your diet is important, and you need to know the right kind of foods to eat. A diet that can help to lose weight and manage prediabetes will normally include foods that are:  low in fat  low in calories  high in fiber  -  Please eat plenty of vegetables, whole grains, lean meats, protein-packed legumes. Please be careful to avoid added sugars. Fruits contain sugar, but they also provide fiber and other nutrients. For this reason, a person can include a limited amount of fruit in their diet.  -  Your hemoglobin A1C level was 5.8. Please check your Hgb A1c level every 3 months to monitor.  You can’t control all risk factors for prediabetes, but some can be mitigated. Lifestyle changes can help you maintain balanced blood sugar levels as well as a healthy weight.      Diagnosis: New onset of congestive heart failure  Assessment and Plan of Treatment: Weigh yourself daily.  If you gain 3lbs in 3 days, or 5lbs in a week call your Health Care Provider.  Do not eat or drink foods containing more than 2000mg of salt (sodium) in your diet every day.  Call your Health Care Provider if you have any swelling or increased swelling in your feet, ankles, and/or stomach.  Take all of your medication as directed.  If you become dizzy call your Health Care Provider.      Diagnosis: ACS (acute coronary syndrome)  Assessment and Plan of Treatment: Cardiac catheterization or coronary angiogram is done to understand how the heart is working and to look at the coronary arteries which supply oxygen to the heart muscles, to look at the heart chambers and the valves in the heart.  The doctor uses your groin or your arm to do the procedure  You will need to see your doctor within one week after discharge  Call your doctor if the insertion site bleeds a lot, if you get a fever or have pain, swelling, or redness where the tube went in, or if your leg feels weak, numb, or cold.  No heavy lifting, strenuous activity, bending, straining or unnecessary stair climbing  for 2 weeks. No sex for 1 week, and no driving for 2 days after cath is recommeded. You may shower 24 hours following procedure but avoid baths and swimming for 1 week. Check the site for bleeding and/or swelling daily following procedure. Call your doctor/cardiologist immediately should it occur or if you have increased/persistent pain at the site. Follow up with your cardiologist in 1- 2 weeks. You may call [facility] at [phone] or [phone] after office hours and weekends  with any questions or concerns following your procedure. Take medications as prescribed.      Diagnosis: Anemia, iron deficiency  Assessment and Plan of Treatment: Symptoms to report, bleeding, palpitations, fatigue, pale skin, cold skin, dizziness. Take oral iron as directed.

## 2021-11-14 NOTE — PROGRESS NOTE ADULT - ASSESSMENT
47 yo man admitted for AWSTEMI s/p HALLE to pLAD and POBA to D1. TTE with EF 40%.    #CAD s/p HALLE to pLAD and POBA to D1  - C/w DAPT w/ ASA 81mg daily and brilinta 90mg BID, make sure brilinta is covered by insurance  - C/w metoprolol succinate 50mg daily and lisinopril 5mg daily    #ICM HFrEF  - C/w BB and ACEi as above  - C/w aldactone 12.5mg daily    Make cardiology outpatient appt to be seen upon discharge    Bykylie Velásquez MD  PGY5 Cardiology 45 yo man admitted for AWSTEMI s/p HALLE to pLAD and POBA to D1. TTE with EF 40%.    #CAD s/p HALLE to pLAD and POBA to D1  - C/w DAPT w/ ASA 81mg daily and brilinta 90mg BID, make sure brilinta is covered by insurance  - C/w metoprolol succinate 50mg daily and lisinopril 5mg daily    #ICM HFrEF  - C/w BB and ACEi as above  - C/w aldactone 12.5mg daily    Make cardiology outpatient appt to be seen upon discharge    Bykylie Velásquez MD  PGY5 Cardiology

## 2021-11-15 ENCOUNTER — TRANSCRIPTION ENCOUNTER (OUTPATIENT)
Age: 46
End: 2021-11-15

## 2021-11-15 VITALS
HEART RATE: 82 BPM | TEMPERATURE: 98 F | DIASTOLIC BLOOD PRESSURE: 72 MMHG | RESPIRATION RATE: 16 BRPM | OXYGEN SATURATION: 98 % | SYSTOLIC BLOOD PRESSURE: 113 MMHG

## 2021-11-15 DIAGNOSIS — Z02.9 ENCOUNTER FOR ADMINISTRATIVE EXAMINATIONS, UNSPECIFIED: ICD-10-CM

## 2021-11-15 LAB
ALBUMIN SERPL ELPH-MCNC: 4.1 G/DL — SIGNIFICANT CHANGE UP (ref 3.3–5)
ALP SERPL-CCNC: 137 U/L — HIGH (ref 40–120)
ALT FLD-CCNC: 20 U/L — SIGNIFICANT CHANGE UP (ref 10–45)
ANION GAP SERPL CALC-SCNC: 14 MMOL/L — SIGNIFICANT CHANGE UP (ref 5–17)
AST SERPL-CCNC: 19 U/L — SIGNIFICANT CHANGE UP (ref 10–40)
BILIRUB SERPL-MCNC: 0.9 MG/DL — SIGNIFICANT CHANGE UP (ref 0.2–1.2)
BUN SERPL-MCNC: 21 MG/DL — SIGNIFICANT CHANGE UP (ref 7–23)
CALCIUM SERPL-MCNC: 8.9 MG/DL — SIGNIFICANT CHANGE UP (ref 8.4–10.5)
CHLORIDE SERPL-SCNC: 106 MMOL/L — SIGNIFICANT CHANGE UP (ref 96–108)
CK MB BLD-MCNC: 1.8 % — SIGNIFICANT CHANGE UP (ref 0–3.5)
CK MB CFR SERPL CALC: 2.4 NG/ML — SIGNIFICANT CHANGE UP (ref 0–6.7)
CK SERPL-CCNC: 131 U/L — SIGNIFICANT CHANGE UP (ref 30–200)
CO2 SERPL-SCNC: 19 MMOL/L — LOW (ref 22–31)
CREAT SERPL-MCNC: 1.26 MG/DL — SIGNIFICANT CHANGE UP (ref 0.5–1.3)
GLUCOSE SERPL-MCNC: 91 MG/DL — SIGNIFICANT CHANGE UP (ref 70–99)
HCT VFR BLD CALC: 37.4 % — LOW (ref 39–50)
HGB BLD-MCNC: 10.6 G/DL — LOW (ref 13–17)
MAGNESIUM SERPL-MCNC: 2.4 MG/DL — SIGNIFICANT CHANGE UP (ref 1.6–2.6)
MCHC RBC-ENTMCNC: 20.3 PG — LOW (ref 27–34)
MCHC RBC-ENTMCNC: 28.3 GM/DL — LOW (ref 32–36)
MCV RBC AUTO: 71.6 FL — LOW (ref 80–100)
NRBC # BLD: 0 /100 WBCS — SIGNIFICANT CHANGE UP (ref 0–0)
NT-PROBNP SERPL-SCNC: 165 PG/ML — SIGNIFICANT CHANGE UP (ref 0–300)
PHOSPHATE SERPL-MCNC: 3.6 MG/DL — SIGNIFICANT CHANGE UP (ref 2.5–4.5)
PLATELET # BLD AUTO: 297 K/UL — SIGNIFICANT CHANGE UP (ref 150–400)
POTASSIUM SERPL-MCNC: 4 MMOL/L — SIGNIFICANT CHANGE UP (ref 3.5–5.3)
POTASSIUM SERPL-SCNC: 4 MMOL/L — SIGNIFICANT CHANGE UP (ref 3.5–5.3)
PROT SERPL-MCNC: 7 G/DL — SIGNIFICANT CHANGE UP (ref 6–8.3)
RBC # BLD: 5.22 M/UL — SIGNIFICANT CHANGE UP (ref 4.2–5.8)
RBC # FLD: 16.8 % — HIGH (ref 10.3–14.5)
SODIUM SERPL-SCNC: 139 MMOL/L — SIGNIFICANT CHANGE UP (ref 135–145)
TROPONIN T, HIGH SENSITIVITY RESULT: 3011 NG/L — HIGH (ref 0–51)
WBC # BLD: 10.42 K/UL — SIGNIFICANT CHANGE UP (ref 3.8–10.5)
WBC # FLD AUTO: 10.42 K/UL — SIGNIFICANT CHANGE UP (ref 3.8–10.5)

## 2021-11-15 PROCEDURE — 81003 URINALYSIS AUTO W/O SCOPE: CPT

## 2021-11-15 PROCEDURE — 83550 IRON BINDING TEST: CPT

## 2021-11-15 PROCEDURE — 36415 COLL VENOUS BLD VENIPUNCTURE: CPT

## 2021-11-15 PROCEDURE — 83540 ASSAY OF IRON: CPT

## 2021-11-15 PROCEDURE — 99233 SBSQ HOSP IP/OBS HIGH 50: CPT | Mod: 25

## 2021-11-15 PROCEDURE — 84484 ASSAY OF TROPONIN QUANT: CPT

## 2021-11-15 PROCEDURE — 80061 LIPID PANEL: CPT

## 2021-11-15 PROCEDURE — 84540 ASSAY OF URINE/UREA-N: CPT

## 2021-11-15 PROCEDURE — 83605 ASSAY OF LACTIC ACID: CPT

## 2021-11-15 PROCEDURE — 84156 ASSAY OF PROTEIN URINE: CPT

## 2021-11-15 PROCEDURE — 86769 SARS-COV-2 COVID-19 ANTIBODY: CPT

## 2021-11-15 PROCEDURE — 85018 HEMOGLOBIN: CPT

## 2021-11-15 PROCEDURE — 85025 COMPLETE CBC W/AUTO DIFF WBC: CPT

## 2021-11-15 PROCEDURE — 86850 RBC ANTIBODY SCREEN: CPT

## 2021-11-15 PROCEDURE — 92921: CPT | Mod: LD

## 2021-11-15 PROCEDURE — C1769: CPT

## 2021-11-15 PROCEDURE — C1887: CPT

## 2021-11-15 PROCEDURE — 85730 THROMBOPLASTIN TIME PARTIAL: CPT

## 2021-11-15 PROCEDURE — 82435 ASSAY OF BLOOD CHLORIDE: CPT

## 2021-11-15 PROCEDURE — 83735 ASSAY OF MAGNESIUM: CPT

## 2021-11-15 PROCEDURE — 82553 CREATINE MB FRACTION: CPT

## 2021-11-15 PROCEDURE — 99153 MOD SED SAME PHYS/QHP EA: CPT

## 2021-11-15 PROCEDURE — C9606: CPT | Mod: LD

## 2021-11-15 PROCEDURE — C1753: CPT

## 2021-11-15 PROCEDURE — 93308 TTE F-UP OR LMTD: CPT | Mod: 26

## 2021-11-15 PROCEDURE — 85027 COMPLETE CBC AUTOMATED: CPT

## 2021-11-15 PROCEDURE — 99239 HOSP IP/OBS DSCHRG MGMT >30: CPT

## 2021-11-15 PROCEDURE — U0003: CPT

## 2021-11-15 PROCEDURE — 82803 BLOOD GASES ANY COMBINATION: CPT

## 2021-11-15 PROCEDURE — C1874: CPT

## 2021-11-15 PROCEDURE — 84443 ASSAY THYROID STIM HORMONE: CPT

## 2021-11-15 PROCEDURE — 84300 ASSAY OF URINE SODIUM: CPT

## 2021-11-15 PROCEDURE — 84466 ASSAY OF TRANSFERRIN: CPT

## 2021-11-15 PROCEDURE — 99152 MOD SED SAME PHYS/QHP 5/>YRS: CPT

## 2021-11-15 PROCEDURE — 83036 HEMOGLOBIN GLYCOSYLATED A1C: CPT

## 2021-11-15 PROCEDURE — 84132 ASSAY OF SERUM POTASSIUM: CPT

## 2021-11-15 PROCEDURE — 99291 CRITICAL CARE FIRST HOUR: CPT | Mod: 25

## 2021-11-15 PROCEDURE — 71045 X-RAY EXAM CHEST 1 VIEW: CPT

## 2021-11-15 PROCEDURE — 82728 ASSAY OF FERRITIN: CPT

## 2021-11-15 PROCEDURE — 83880 ASSAY OF NATRIURETIC PEPTIDE: CPT

## 2021-11-15 PROCEDURE — 86900 BLOOD TYPING SEROLOGIC ABO: CPT

## 2021-11-15 PROCEDURE — 80053 COMPREHEN METABOLIC PANEL: CPT

## 2021-11-15 PROCEDURE — 86901 BLOOD TYPING SEROLOGIC RH(D): CPT

## 2021-11-15 PROCEDURE — C8929: CPT

## 2021-11-15 PROCEDURE — 82570 ASSAY OF URINE CREATININE: CPT

## 2021-11-15 PROCEDURE — 92978 ENDOLUMINL IVUS OCT C 1ST: CPT | Mod: LD

## 2021-11-15 PROCEDURE — C8924: CPT

## 2021-11-15 PROCEDURE — 82330 ASSAY OF CALCIUM: CPT

## 2021-11-15 PROCEDURE — 82947 ASSAY GLUCOSE BLOOD QUANT: CPT

## 2021-11-15 PROCEDURE — 84295 ASSAY OF SERUM SODIUM: CPT

## 2021-11-15 PROCEDURE — 93458 L HRT ARTERY/VENTRICLE ANGIO: CPT | Mod: 59

## 2021-11-15 PROCEDURE — 82550 ASSAY OF CK (CPK): CPT

## 2021-11-15 PROCEDURE — 85610 PROTHROMBIN TIME: CPT

## 2021-11-15 PROCEDURE — 85014 HEMATOCRIT: CPT

## 2021-11-15 PROCEDURE — 93321 DOPPLER ECHO F-UP/LMTD STD: CPT | Mod: 26

## 2021-11-15 PROCEDURE — C1894: CPT

## 2021-11-15 PROCEDURE — 93005 ELECTROCARDIOGRAM TRACING: CPT

## 2021-11-15 PROCEDURE — 84100 ASSAY OF PHOSPHORUS: CPT

## 2021-11-15 PROCEDURE — 93321 DOPPLER ECHO F-UP/LMTD STD: CPT

## 2021-11-15 PROCEDURE — C1725: CPT

## 2021-11-15 RX ORDER — ATORVASTATIN CALCIUM 80 MG/1
1 TABLET, FILM COATED ORAL
Qty: 30 | Refills: 0
Start: 2021-11-15 | End: 2021-12-14

## 2021-11-15 RX ORDER — ASPIRIN/CALCIUM CARB/MAGNESIUM 324 MG
1 TABLET ORAL
Qty: 30 | Refills: 0
Start: 2021-11-15 | End: 2021-12-14

## 2021-11-15 RX ORDER — FERROUS SULFATE 325(65) MG
1 TABLET ORAL
Qty: 30 | Refills: 0
Start: 2021-11-15 | End: 2021-12-14

## 2021-11-15 RX ORDER — LISINOPRIL 2.5 MG/1
1 TABLET ORAL
Qty: 30 | Refills: 0
Start: 2021-11-15 | End: 2021-12-14

## 2021-11-15 RX ORDER — TICAGRELOR 90 MG/1
1 TABLET ORAL
Qty: 60 | Refills: 0
Start: 2021-11-15 | End: 2021-12-14

## 2021-11-15 RX ORDER — SPIRONOLACTONE 25 MG/1
0.5 TABLET, FILM COATED ORAL
Qty: 15 | Refills: 0
Start: 2021-11-15 | End: 2021-12-14

## 2021-11-15 RX ORDER — METOPROLOL TARTRATE 50 MG
1 TABLET ORAL
Qty: 30 | Refills: 0
Start: 2021-11-15 | End: 2021-12-14

## 2021-11-15 RX ADMIN — Medication 50 MILLIGRAM(S): at 05:24

## 2021-11-15 RX ADMIN — LISINOPRIL 5 MILLIGRAM(S): 2.5 TABLET ORAL at 05:25

## 2021-11-15 RX ADMIN — Medication 81 MILLIGRAM(S): at 12:50

## 2021-11-15 RX ADMIN — SPIRONOLACTONE 12.5 MILLIGRAM(S): 25 TABLET, FILM COATED ORAL at 05:24

## 2021-11-15 RX ADMIN — CHLORHEXIDINE GLUCONATE 1 APPLICATION(S): 213 SOLUTION TOPICAL at 12:51

## 2021-11-15 RX ADMIN — TICAGRELOR 90 MILLIGRAM(S): 90 TABLET ORAL at 05:24

## 2021-11-15 NOTE — PROGRESS NOTE ADULT - PROBLEM SELECTOR PLAN 1
Continue ASA/Brilinta (if covered by insurance)  Continue statin, ACE-I, and beta-blocker  Troponin downtrending  Outpatient f/u w/ Dr. Maxime Deluna within 7 days of discharge  Repeat echocardiogram completed today to r/o LV thrombus  Smoking cessation counseling

## 2021-11-15 NOTE — PHARMACOTHERAPY INTERVENTION NOTE - COMMENTS
Counseled patient on discharge medication doses, indications, and possible side effects. Provided and reviewed medication cards and heart failure booklet. Answered all of the patient's questions to the best of my ability. Patient exhibited understanding of discharge medication regimen.     Confirmed coverage with [facility] for new medication Brilinta - covered with $25 copay. Per patient, he has commercial health insurance - helped sign patient up for  copay card to bring monthly copay down to $5.     Daiana Braun, PharmD, [facility]  Clinical Pharmacy Specialist  [phone] or Teams

## 2021-11-15 NOTE — PROGRESS NOTE ADULT - SUBJECTIVE AND OBJECTIVE BOX
Gretel Caldera MD  Cardiology Fellow  [phone]  All Cardiology service information can be found 24/7 on [email or web address], password: [email or web address]    Patient seen and examined at bedside.    Overnight Events:   NAEON     Review Of Systems: No chest pain, shortness of breath, or palpitations            Current Meds:  aspirin enteric coated 81 milliGRAM(s) Oral daily  atorvastatin 80 milliGRAM(s) Oral at bedtime  chlorhexidine 4% Liquid 1 Application(s) Topical <User Schedule>  influenza   Vaccine 0.5 milliLiter(s) IntraMuscular once  iron sucrose IVPB 200 milliGRAM(s) IV Intermittent every 24 hours  lisinopril 5 milliGRAM(s) Oral daily  metoprolol succinate ER 50 milliGRAM(s) Oral daily  spironolactone 12.5 milliGRAM(s) Oral daily  ticagrelor 90 milliGRAM(s) Oral every 12 hours      Vitals:  T(F): 97.9 (11-15), Max: 98.7 (11-14)  HR: 87 (11-15) (86 - 93)  BP: 104/71 (11-15) (104/71 - 121/81)  RR: 18 (11-15)  SpO2: 97% (11-15)  I&O's Summary    13 Nov 2021 07:01  -  14 Nov 2021 07:00  --------------------------------------------------------  IN: 680 mL / OUT: 350 mL / NET: 330 mL    14 Nov 2021 07:01  -  15 Nov 2021 05:38  --------------------------------------------------------  IN: 480 mL / OUT: 0 mL / NET: 480 mL        Physical Exam:  GENERAL: No acute distress, well-developed  HEAD:  Atraumatic, Normocephalic  ENT: EOMI, PERRLA, conjunctiva and sclera clear, Neck supple, No JVD, moist mucosa  CHEST/LUNG: Clear to auscultation bilaterally; No wheeze, equal breath sounds bilaterally   BACK: No spinal tenderness  HEART: Regular rate and rhythm; No murmurs, rubs, or gallops  ABDOMEN: Soft, Nontender, Nondistended; Bowel sounds present  EXTREMITIES:  No clubbing, cyanosis, or edema  PSYCH: Nl behavior, nl affect  NEUROLOGY: AAOx3, non-focal, cranial nerves intact  SKIN: Normal color, No rashes or lesions                            11.0   11.14 )-----------( 325      ( 14 Nov 2021 06:51 )             38.3     11-14    138  |  105  |  22  ----------------------------<  97  3.6   |  19<L>  |  1.32<H>    Ca    9.1      14 Nov 2021 06:52    TPro  7.2  /  Alb  4.2  /  TBili  1.2  /  DBili  x   /  AST  34  /  ALT  20  /  AlkPhos  135<H>  11-14      CARDIAC MARKERS ( 14 Nov 2021 06:52 )  4276 ng/L / x     / x     / 198 U/L / x     / 6.7 ng/mL  CARDIAC MARKERS ( 13 Nov 2021 04:51 )  2978 ng/L / x     / x     / 619 U/L / x     / 39.9 ng/mL  CARDIAC MARKERS ( 12 Nov 2021 21:50 )  2142 ng/L / x     / x     / 806 U/L / x     / 59.9 ng/mL  CARDIAC MARKERS ( 12 Nov 2021 06:18 )  905 ng/L / x     / x     / 524 U/L / x     / 49.0 ng/mL  CARDIAC MARKERS ( 12 Nov 2021 01:48 )  636 ng/L / x     / x     / x     / x     / x          Serum Pro-Brain Natriuretic Peptide: 708 pg/mL (11-12 @ 01:48)    New ECG(s):    Interpretation of Telemetry:   Gretel Caldera MD  Cardiology Fellow  [phone]  All Cardiology service information can be found 24/7 on [email or web address], password: [email or web address]    Patient seen and examined at bedside.    Overnight Events:   NAEON, patient feeling well this morning     Review Of Systems:    General Symptoms: no fever; no chills  · Skin Symptoms: no rash; no itching  · Ophthalmologic Symptoms: no scleral injection L; no scleral injection R  · ENT Symptoms: no throat pain; no dysphagia; no nasal congestion  · Respiratory and Thorax Symptoms: no cough; no wheezing; no pleuritic chest pain  · Cardiovascular Symptoms: no chest pain; no palpitations; no peripheral edema  · Gastrointestinal Symptoms: no nausea; no vomiting; no diarrhea; no constipation;  · Genitourinary Symptoms: no flank pain, no dysuria  · Musculoskeletal Symptoms: no joint swelling; no neck pain; no back pain  · Neurological: negative  · Psychiatric: negative  · Hematology/Lymphatics: negative  · Endocrine: negative    Current Meds:  aspirin enteric coated 81 milliGRAM(s) Oral daily  atorvastatin 80 milliGRAM(s) Oral at bedtime  chlorhexidine 4% Liquid 1 Application(s) Topical <User Schedule>  influenza   Vaccine 0.5 milliLiter(s) IntraMuscular once  iron sucrose IVPB 200 milliGRAM(s) IV Intermittent every 24 hours  lisinopril 5 milliGRAM(s) Oral daily  metoprolol succinate ER 50 milliGRAM(s) Oral daily  spironolactone 12.5 milliGRAM(s) Oral daily  ticagrelor 90 milliGRAM(s) Oral every 12 hours      Vitals:  T(F): 97.9 (11-15), Max: 98.7 (11-14)  HR: 87 (11-15) (86 - 93)  BP: 104/71 (11-15) (104/71 - 121/81)  RR: 18 (11-15)  SpO2: 97% (11-15)  I&O's Summary    13 Nov 2021 07:01  -  14 Nov 2021 07:00  --------------------------------------------------------  IN: 680 mL / OUT: 350 mL / NET: 330 mL    14 Nov 2021 07:01  -  15 Nov 2021 05:38  --------------------------------------------------------  IN: 480 mL / OUT: 0 mL / NET: 480 mL        Physical Exam:  GENERAL: No acute distress, well-developed  HEAD:  Atraumatic, Normocephalic  ENT: EOMI, PERRLA, conjunctiva and sclera clear, Neck supple, No JVD, moist mucosa  CHEST/LUNG: Clear to auscultation bilaterally; No wheeze, equal breath sounds bilaterally   BACK: No spinal tenderness  HEART: Regular rate and rhythm; No murmurs, rubs, or gallops  ABDOMEN: Soft, Nontender, Nondistended; Bowel sounds present  EXTREMITIES:  No clubbing, cyanosis, or edema  PSYCH: Nl behavior, nl affect  NEUROLOGY: AAOx3, non-focal, cranial nerves intact  SKIN: Normal color, No rashes or lesions                            11.0   11.14 )-----------( 325      ( 14 Nov 2021 06:51 )             38.3     11-14    138  |  105  |  22  ----------------------------<  97  3.6   |  19<L>  |  1.32<H>    Ca    9.1      14 Nov 2021 06:52    TPro  7.2  /  Alb  4.2  /  TBili  1.2  /  DBili  x   /  AST  34  /  ALT  20  /  AlkPhos  135<H>  11-14      CARDIAC MARKERS ( 14 Nov 2021 06:52 )  4276 ng/L / x     / x     / 198 U/L / x     / 6.7 ng/mL  CARDIAC MARKERS ( 13 Nov 2021 04:51 )  2978 ng/L / x     / x     / 619 U/L / x     / 39.9 ng/mL  CARDIAC MARKERS ( 12 Nov 2021 21:50 )  2142 ng/L / x     / x     / 806 U/L / x     / 59.9 ng/mL  CARDIAC MARKERS ( 12 Nov 2021 06:18 )  905 ng/L / x     / x     / 524 U/L / x     / 49.0 ng/mL  CARDIAC MARKERS ( 12 Nov 2021 01:48 )  636 ng/L / x     / x     / x     / x     / x          Serum Pro-Brain Natriuretic Peptide: 708 pg/mL (11-12 @ 01:48)    New ECG(s):    Interpretation of Telemetry:  SR 70s-80s

## 2021-11-15 NOTE — PROGRESS NOTE ADULT - ATTENDING COMMENTS
46yrs, no PMH.   admitted 11.12 CP 4 days. STEMI,   1X HALLE prox LAD. POBA diag.   TTE  post: LVEF LVEDD 6cm. RV normal.   risk factors: none known.   meds: asa, brelynta, atorva 80, lopressor 25 bid, lisinopril 5.   HR 80, MAPs 65-70s, afebrile.   11-13  139  |  104  |  12  ----------------------------<  115<H>  3.7   |  21<L>  |  1.16    Ca    9.1      13 Nov 2021 04:51  Phos  3.8     11-13  Mg     2.2     11-13  TPro  7.0  /  Alb  4.1  /  TBili  1.3<H>  /  DBili  x   /  AST  88<H>  /  ALT  26  /  AlkPhos  136<H>  11-13  WBC 12.   Hs Trop 2900, 600  Plan;  for d/c to floor possible home tomorrow.   add aldactone 12.5.   make lopressor toprol XL 50 from tomorrow.   f/u with outpatient cards within 2 weeks.   Jeet Hilton
46 year old man with AWSTEMI, has undergone LAD stent, diagonal balloon angioplasty, on GDMT and preparing for discharge with outpatient follow up.    To contact call Cardiology Fellow or Attending as listed on [email or web address] password: [email or web address].
AWSTEMI s/p HALLE to pLAD and POBA to D1 and HFmEF TTE EF 40% on GDMT uptitrating as tolerated. Pending limited ECHO to rule out LV thrombus and discharge thereafter.    Olivier Aguayo MD, MPH, RENAE, RPVI, [facility]  Inpatient Cardiovascular Specialist; [facility], [facility] ([facility])  ; [employer] at [facility]  message: SEDLine [phone] or Microsoft Teams (text preferred and/or call)  email: [email or web address] and Cardiovascular Surgery on-service contact/call information, go to [email or web address] and use "[email or web address]" to login.  Outpatient Cardiology appointments, call  [phone] to arrange with a colleague; I do not have outpatient Cardiology clinic.

## 2021-11-15 NOTE — PROGRESS NOTE ADULT - PROBLEM SELECTOR PROBLEM 2
New onset of congestive heart failure

## 2021-11-15 NOTE — PROGRESS NOTE ADULT - ASSESSMENT
45 yo man admitted for AWSTEMI s/p HALLE to pLAD and POBA to D1. TTE with EF 40%.    #CAD s/p HALLE to pLAD and POBA to D1  - C/w DAPT w/ ASA 81mg daily and brilinta 90mg BID, make sure brilinta is covered by insurance  - C/w metoprolol succinate 50mg daily and lisinopril 5mg daily (creatinine elevating, can follow up creatinine this morning prior to giving dose)     #ICM HFrEF  - C/w BB and ACEi as above  - C/w aldactone 12.5mg daily    Make cardiology outpatient appt to be seen upon discharge 47 yo man admitted for AWSTEMI s/p HALLE to pLAD and POBA to D1. TTE with EF 40%.    #CAD s/p HALLE to pLAD and POBA to D1  - C/w DAPT w/ ASA 81mg daily and brilinta 90mg BID, make sure brilinta is covered by insurance  - Increase metoprolol succinate to  g daily and lisinopril 5mg daily  - Repeat TTE today      #ICM HFrEF  - C/w BB and ACEi as above  - C/w aldactone 12.5mg daily    #Anemia  -W/u per primary team, patient's labs consistent with LAURA     Make cardiology outpatient appt to be seen upon discharge at our clinic [phone] for follow up in 1-2 weeks

## 2021-11-15 NOTE — PROGRESS NOTE ADULT - PROBLEM SELECTOR PLAN 2
TTE shows LVEF 40% with moderate segmental WMAs and mild diastolic dysfunction  Continue Spironolactone

## 2021-11-15 NOTE — PROGRESS NOTE ADULT - ASSESSMENT
46M no known PMHx p/w anterior wall STEMI s/p LHC with HALLE to 80% pLAD and POBA to diag on 11/13.

## 2021-11-15 NOTE — DISCHARGE NOTE NURSING/CASE MANAGEMENT/SOCIAL WORK - PATIENT PORTAL LINK FT
You can access the FollowMyHealth Patient Portal offered by [facility] by registering at the following website: [email or web address]. By joining [employer]’s FollowMyHealth portal, you will also be able to view your health information using other applications (apps) compatible with our system.

## 2021-11-15 NOTE — PROGRESS NOTE ADULT - SUBJECTIVE AND OBJECTIVE BOX
Cortez Rose MD  Pager [phone]  Spectra [phone]  Cell Phone [phone]    Patient is a 46y old  Male who presents with a chief complaint of NSTEMI (15 Nov 2021 05:38)        SUBJECTIVE / OVERNIGHT EVENTS: Patient feels well. Denies CP/SOB  TELEMETRY: SR/ST 's      MEDICATIONS  (STANDING):  aspirin enteric coated 81 milliGRAM(s) Oral daily  atorvastatin 80 milliGRAM(s) Oral at bedtime  chlorhexidine 4% Liquid 1 Application(s) Topical <User Schedule>  influenza   Vaccine 0.5 milliLiter(s) IntraMuscular once  iron sucrose IVPB 200 milliGRAM(s) IV Intermittent every 24 hours  lisinopril 5 milliGRAM(s) Oral daily  metoprolol succinate ER 50 milliGRAM(s) Oral daily  spironolactone 12.5 milliGRAM(s) Oral daily  ticagrelor 90 milliGRAM(s) Oral every 12 hours    MEDICATIONS  (PRN):      Vital Signs Last 24 Hrs  T(C): 36.6 (15 Nov 2021 04:22), Max: 37.1 (2021 20:43)  T(F): 97.9 (15 Nov 2021 04:22), Max: 98.7 (2021 20:43)  HR: 87 (15 Nov 2021 04:22) (86 - 93)  BP: 104/71 (15 Nov 2021 04:22) (104/71 - 121/81)  BP(mean): --  RR: 18 (15 Nov 2021 04:22) (16 - 18)  SpO2: 97% (15 Nov 2021 04:22) (97% - 99%)  CAPILLARY BLOOD GLUCOSE        I&O's Summary    2021 07:01  -  15 Nov 2021 07:00  --------------------------------------------------------  IN: 480 mL / OUT: 0 mL / NET: 480 mL          PHYSICAL EXAM  GENERAL: NAD, well-developed  HEAD:  Atraumatic, normocephalic  EYES: EOMI, PERRLA, conjunctiva and sclera clear  CHEST/LUNG: Clear to auscultation bilaterally; no wheezes  HEART: +S1+S2, regular rate and rhythm  ABDOMEN: Soft, nontender, nondistended; bowel sounds present  EXTREMITIES:  2+ peripheral pulses; no clubbing, cyanosis, or edema; R wrist no hematoma  PSYCH: AAOx3      LABS:                        10.6   10.42 )-----------( 297      ( 15 Nov 2021 06:51 )             37.4     11-15    139  |  106  |  21  ----------------------------<  91  4.0   |  19<L>  |  1.26    Ca    8.9      15 Nov 2021 06:52  Phos  3.6     -15  Mg     2.4     -15    TPro  7.0  /  Alb  4.1  /  TBili  0.9  /  DBili  x   /  AST  19  /  ALT  20  /  AlkPhos  137<H>  11-15      CARDIAC MARKERS ( 15 Nov 2021 06:52 )  x     / x     / 131 U/L / x     / 2.4 ng/mL  CARDIAC MARKERS ( 2021 06:52 )  x     / x     / 198 U/L / x     / 6.7 ng/mL      Urinalysis Basic - ( 2021 12:00 )    Color: Yellow / Appearance: Clear / S.019 / pH: x  Gluc: x / Ketone: Negative  / Bili: Negative / Urobili: Negative   Blood: x / Protein: Negative / Nitrite: Negative   Leuk Esterase: Negative / RBC: x / WBC x   Sq Epi: x / Non Sq Epi: x / Bacteria: x          RADIOLOGY & ADDITIONAL TESTS:    Imaging Personally Reviewed:  Consultant(s) Notes Reviewed:    Care Discussed with Consultants/Other Providers:

## 2021-11-15 NOTE — PROGRESS NOTE ADULT - PROBLEM SELECTOR PLAN 5
Iron deficiency anemia- received IV Venofer 200 mg one dose  D/C on oral ferrous sulfate 325 mg daily

## 2021-11-22 ENCOUNTER — NON-APPOINTMENT (OUTPATIENT)
Age: 46
End: 2021-11-22

## 2021-11-23 ENCOUNTER — APPOINTMENT (OUTPATIENT)
Dept: CARDIOLOGY | Facility: CLINIC | Age: 46
End: 2021-11-23
Payer: COMMERCIAL

## 2021-11-23 VITALS
BODY MASS INDEX: 35.56 KG/M2 | HEIGHT: 71 IN | SYSTOLIC BLOOD PRESSURE: 143 MMHG | DIASTOLIC BLOOD PRESSURE: 89 MMHG | WEIGHT: 254 LBS | OXYGEN SATURATION: 100 % | HEART RATE: 103 BPM

## 2021-11-23 DIAGNOSIS — Z78.9 OTHER SPECIFIED HEALTH STATUS: ICD-10-CM

## 2021-11-23 DIAGNOSIS — F17.200 NICOTINE DEPENDENCE, UNSPECIFIED, UNCOMPLICATED: ICD-10-CM

## 2021-11-23 PROCEDURE — 99205 OFFICE O/P NEW HI 60 MIN: CPT

## 2021-11-23 PROCEDURE — 99215 OFFICE O/P EST HI 40 MIN: CPT

## 2021-11-23 PROCEDURE — 93000 ELECTROCARDIOGRAM COMPLETE: CPT

## 2021-11-23 RX ORDER — FERROUS SULFATE TAB EC 325 MG (65 MG FE EQUIVALENT) 325 (65 FE) MG
325 (65 FE) TABLET DELAYED RESPONSE ORAL
Refills: 0 | Status: ACTIVE | COMMUNITY

## 2021-11-28 NOTE — DISCUSSION/SUMMARY
[FreeTextEntry1] : Plan:\par 1. continue DAPT long-term\par 2. change metoprolol succinate to carvedilol 12.5mg twice daily\par 3. maintain BP log at home\par 4. low salt diet\par 5. follow up in 1 month.

## 2021-11-28 NOTE — HISTORY OF PRESENT ILLNESS
[FreeTextEntry1] : Mr Angel is a 46 year old man who presented with aborted anterior wall STEMI on Nov 12th and is s/p PCI to LAD/D1 bifurcation lesion. LVEF was 45% on TTE prior to discharge. There is no significant past medical history and he is a non-smoker. He has been well since his PCI and has had no recurrence of cardiac symptoms. He has been taking his ticagrelor once a day and we have explained that this must be taken twice a day. We discussed other management goals moving forward including strict medication adherence and secondary prevention. \par

## 2021-11-28 NOTE — CARDIOLOGY SUMMARY
[de-identified] : 11/23/2021 - SR [de-identified] : 11/12/2021 - pLAD Stephon 5.0x18mm, ostial D1 3.5mm POBA

## 2021-12-21 ENCOUNTER — APPOINTMENT (OUTPATIENT)
Dept: CARDIOLOGY | Facility: CLINIC | Age: 46
End: 2021-12-21
Payer: COMMERCIAL

## 2021-12-21 VITALS — DIASTOLIC BLOOD PRESSURE: 100 MMHG | SYSTOLIC BLOOD PRESSURE: 149 MMHG | HEART RATE: 77 BPM | OXYGEN SATURATION: 99 %

## 2021-12-21 PROCEDURE — 99214 OFFICE O/P EST MOD 30 MIN: CPT

## 2021-12-21 PROCEDURE — 93000 ELECTROCARDIOGRAM COMPLETE: CPT

## 2022-01-04 NOTE — HISTORY OF PRESENT ILLNESS
[FreeTextEntry1] : Mr Angel is a 46 year old man who presented with aborted anterior wall STEMI on Nov 12th and is s/p PCI to LAD/D1 bifurcation lesion. LVEF was 45% on TTE prior to discharge. There is no significant past medical history and he is a non-smoker. He has been well since his PCI and has had no recurrence of cardiac symptoms. He has been taking his ticagrelor once a day and we have explained that this must be taken twice a day. We discussed other management goals moving forward including strict medication adherence and secondary prevention. \par \par Since his last visit Mr Angel has resumed taking his DAPT as prescribed. He is asymptomatic from a cardiac perspective. Blood pressure remains elevated today despite being commenced on carvedilol last month.

## 2022-01-04 NOTE — DISCUSSION/SUMMARY
[FreeTextEntry1] : \par Plan:\par 1. increase lisinopril to 20mg daily\par 2. stressed importance of medication aherence\par 3. discussed lifestyle secondary prevention measures including weight loss\par 4. follow-up 1-2 months.

## 2022-01-04 NOTE — CARDIOLOGY SUMMARY
[de-identified] : 12/23/2021 - SR [de-identified] : 11/12/2021 - pLAD Stephon 5.0x18mm, ostial D1 3.5mm POBA

## 2022-02-22 ENCOUNTER — NON-APPOINTMENT (OUTPATIENT)
Age: 47
End: 2022-02-22

## 2022-02-22 ENCOUNTER — APPOINTMENT (OUTPATIENT)
Dept: CARDIOLOGY | Facility: CLINIC | Age: 47
End: 2022-02-22
Payer: COMMERCIAL

## 2022-02-22 VITALS
SYSTOLIC BLOOD PRESSURE: 141 MMHG | WEIGHT: 250 LBS | OXYGEN SATURATION: 97 % | HEART RATE: 73 BPM | DIASTOLIC BLOOD PRESSURE: 86 MMHG | HEIGHT: 71 IN | BODY MASS INDEX: 35 KG/M2

## 2022-02-22 PROCEDURE — 93000 ELECTROCARDIOGRAM COMPLETE: CPT

## 2022-02-22 PROCEDURE — 99214 OFFICE O/P EST MOD 30 MIN: CPT

## 2022-02-28 NOTE — DISCUSSION/SUMMARY
[FreeTextEntry1] : \par Plan:\par 1. continue current medications\par 2. aggressive secondary prevention incl exercise and weight loss\par 3. Follow-up 3 months

## 2022-02-28 NOTE — CARDIOLOGY SUMMARY
[de-identified] : 11/12/2021 - pLAD Stephon 5.0x18mm, ostial D1 3.5mm POBA  [de-identified] : 2/22/22 - SR

## 2022-02-28 NOTE — HISTORY OF PRESENT ILLNESS
[FreeTextEntry1] : Mr Angel is a 46 year old man who presented with aborted anterior wall STEMI on Nov 12th and is s/p PCI to LAD/D1 bifurcation lesion. LVEF was 45% on TTE prior to discharge. There is no significant past medical history and he is a non-smoker. He has been well since his PCI and has had no recurrence of cardiac symptoms. He has been taking his ticagrelor once a day and we have explained that this must be taken twice a day. We discussed other management goals moving forward including strict medication adherence and secondary prevention. \par \par Since his last visit Mr Angel has resumed taking his DAPT as prescribed. He is asymptomatic from a cardiac perspective. Blood pressure remains elevated today despite being commenced on carvedilol last month. \par \par Follow up 2/22/22 - Mr Angel remains asymptomatic from a cardiac perspective. BP some improvement 141/89mmHg with increased dose lisinopril. Medication adherence is good. No other issues reported. Discussed long-term goals of exercise and weight loss.

## 2022-04-26 ENCOUNTER — NON-APPOINTMENT (OUTPATIENT)
Age: 47
End: 2022-04-26

## 2022-04-26 ENCOUNTER — APPOINTMENT (OUTPATIENT)
Dept: CARDIOLOGY | Facility: CLINIC | Age: 47
End: 2022-04-26
Payer: COMMERCIAL

## 2022-04-26 VITALS — OXYGEN SATURATION: 97 % | SYSTOLIC BLOOD PRESSURE: 134 MMHG | DIASTOLIC BLOOD PRESSURE: 82 MMHG | HEART RATE: 73 BPM

## 2022-04-26 PROCEDURE — 93000 ELECTROCARDIOGRAM COMPLETE: CPT

## 2022-04-26 PROCEDURE — 99214 OFFICE O/P EST MOD 30 MIN: CPT

## 2022-04-28 NOTE — CARDIOLOGY SUMMARY
[de-identified] : 4/26/22 - SR [de-identified] : 11/12/2021 - pLAD Stephon 5.0x18mm, ostial D1 3.5mm POBA

## 2022-04-28 NOTE — DISCUSSION/SUMMARY
[FreeTextEntry1] : \par Plan:\par 1. Continue current medications \par 2. Should NOT stop DAPT for any procedures (dental etc) unless approved by me\par 3. Gastroenterology review regarding iron deficiency anemia\par 4. follow-up 4-6 months

## 2022-04-28 NOTE — HISTORY OF PRESENT ILLNESS
[FreeTextEntry1] : Mr Angel is a 46 year old man who presented with aborted anterior wall STEMI on Nov 12th and is s/p PCI to LAD/D1 bifurcation lesion. LVEF was 45% on TTE prior to discharge. There is no significant past medical history and he is a non-smoker. He has been well since his PCI and has had no recurrence of cardiac symptoms. He has been taking his ticagrelor once a day and we have explained that this must be taken twice a day. We discussed other management goals moving forward including strict medication adherence and secondary prevention. \par \par Since his last visit Mr Angel has resumed taking his DAPT as prescribed. He is asymptomatic from a cardiac perspective. Blood pressure remains elevated today despite being commenced on carvedilol last month. \par \par Follow up 2/22/22 - Mr Angel remains asymptomatic from a cardiac perspective. BP some improvement 141/89mmHg with increased dose lisinopril. Medication adherence is good. No other issues reported. Discussed long-term goals of exercise and weight loss. \par  \par Follow-up 4/26/22 - remains stable from cardiovascular perspective. Noted incidental finding of iron deficiency anemia during hospital admission - advised to seek GI review.

## 2022-05-28 NOTE — ED PROVIDER NOTE - IV ALTEPLASE ADMIN OUTSIDE HIDDEN
Patient ambulated to bathroom with my assistance, patient had steady gait and no complaints.  Patient dressed and shoulder immobilizer was reapplied in correct position.  Patient denied any complaints.     show

## 2022-08-30 ENCOUNTER — NON-APPOINTMENT (OUTPATIENT)
Age: 47
End: 2022-08-30

## 2022-08-30 ENCOUNTER — APPOINTMENT (OUTPATIENT)
Dept: CARDIOLOGY | Facility: CLINIC | Age: 47
End: 2022-08-30

## 2022-08-30 VITALS
SYSTOLIC BLOOD PRESSURE: 109 MMHG | HEART RATE: 75 BPM | OXYGEN SATURATION: 99 % | DIASTOLIC BLOOD PRESSURE: 74 MMHG | HEIGHT: 71 IN

## 2022-08-30 PROCEDURE — 99214 OFFICE O/P EST MOD 30 MIN: CPT | Mod: 25

## 2022-08-30 PROCEDURE — 93000 ELECTROCARDIOGRAM COMPLETE: CPT

## 2022-09-11 NOTE — DISCUSSION/SUMMARY
[FreeTextEntry1] : 1. He appears stable from a cardiovascular standpoint.\par 2. Continue DAPT with aspirin and ticagrelor.\par 3. Continue other cardiac medication regimen with no changes at this time.\par 4. Patient advised to follow up with GI for further evaluation of possible anemia. Patient understands this.\par 5. Patient will follow up in 4 months or sooner if needed.\par  [EKG obtained to assist in diagnosis and management of assessed problem(s)] : EKG obtained to assist in diagnosis and management of assessed problem(s)

## 2022-09-11 NOTE — HISTORY OF PRESENT ILLNESS
[FreeTextEntry1] : Mr Angel is a 46 year old man who presented with aborted anterior wall STEMI on Nov 12th and is s/p PCI to LAD/D1 bifurcation lesion. LVEF was 45% on TTE prior to discharge. There is no significant past medical history and he is a non-smoker. He has been well since his PCI and has had no recurrence of cardiac symptoms. He has been taking his ticagrelor once a day and we have explained that this must be taken twice a day. We discussed other management goals moving forward including strict medication adherence and secondary prevention. \par \par Since his last visit Mr Angel has resumed taking his DAPT as prescribed. He is asymptomatic from a cardiac perspective. Blood pressure remains elevated today despite being commenced on carvedilol last month. \par \par Follow up 2/22/22 - Mr Angel remains asymptomatic from a cardiac perspective. BP some improvement 141/89mmHg with increased dose lisinopril. Medication adherence is good. No other issues reported. Discussed long-term goals of exercise and weight loss. \par  \par Follow-up 4/26/22 - remains stable from cardiovascular perspective. Noted incidental finding of iron deficiency anemia during hospital admission - advised to seek GI review. \par \par Follow-up 8/30/22 - patient remains stable overall from a cardiac standpoint. No anginal symptoms. He has been compliant with his current cardiac medications. He reports no further or recurrent bleeding issues since his last visit. He has not yet seen GI for anemia work up - advised to follow up regarding this.

## 2022-09-11 NOTE — ASSESSMENT
[FreeTextEntry1] : Benign essential hypertension (401.1) (I10)\par CAD (coronary artery disease) (414.00) (I25.10)\par History of anterior STEMI s/p PCI to LAD/D1 bifurcation lesion November 2021

## 2022-09-11 NOTE — CARDIOLOGY SUMMARY
[de-identified] : 8/30/22 - SR. [de-identified] : 11/12/2021 - pLAD Stephon 5.0x18mm, ostial D1 3.5mm POBA.

## 2023-01-11 ENCOUNTER — TRANSCRIPTION ENCOUNTER (OUTPATIENT)
Age: 48
End: 2023-01-11

## 2023-01-22 NOTE — DISCHARGE NOTE NURSING/CASE MANAGEMENT/SOCIAL WORK - NSDCPEPT PROEDMA_GEN_ALL_CORE
Yes
Assistance with ambulation/Assistance OOB with selected safe patient handling equipment/Communicate Risk of Fall with Harm to all staff/Discuss with provider need for PT consult/Monitor gait and stability/Move patient closer to nurses' station/Provide patient with walking aids - walker, cane, crutches/Reinforce activity limits and safety measures with patient and family/Review medications for side effects contributing to fall risk/Sit up slowly, dangle for a short time, stand at bedside before walking/Tailored Fall Risk Interventions/Use of alarms - bed, chair and/or voice tab/Visual Cue: Yellow wristband and red socks/Bed in lowest position, wheels locked, appropriate side rails in place/Call bell, personal items and telephone in reach/Instruct patient to call for assistance before getting out of bed or chair/Non-slip footwear when patient is out of bed/[place] to call system/Physically safe environment - no spills, clutter or unnecessary equipment/Purposeful Proactive Rounding/Room/bathroom lighting operational, light cord in reach

## 2023-01-30 ENCOUNTER — APPOINTMENT (OUTPATIENT)
Dept: CARDIOLOGY | Facility: CLINIC | Age: 48
End: 2023-01-30
Payer: COMMERCIAL

## 2023-01-30 VITALS
DIASTOLIC BLOOD PRESSURE: 84 MMHG | HEART RATE: 74 BPM | SYSTOLIC BLOOD PRESSURE: 129 MMHG | HEIGHT: 71 IN | WEIGHT: 255 LBS | OXYGEN SATURATION: 98 % | BODY MASS INDEX: 35.7 KG/M2

## 2023-01-30 PROCEDURE — 99214 OFFICE O/P EST MOD 30 MIN: CPT | Mod: 25

## 2023-01-30 PROCEDURE — 93000 ELECTROCARDIOGRAM COMPLETE: CPT

## 2023-03-03 RX ORDER — TICAGRELOR 90 MG/1
90 TABLET ORAL
Qty: 180 | Refills: 3 | Status: DISCONTINUED | COMMUNITY
End: 2023-03-03

## 2023-03-06 NOTE — HISTORY OF PRESENT ILLNESS
[FreeTextEntry1] : Mr Angel is a 47 year old man who presented with aborted anterior wall STEMI on Nov 12th and is s/p PCI to LAD/D1 bifurcation lesion. LVEF was 45% on TTE prior to discharge. There is no significant past medical history and he is a non-smoker. He has been well since his PCI and has had no recurrence of cardiac symptoms. He has been taking his ticagrelor once a day and we have explained that this must be taken twice a day. We discussed other management goals moving forward including strict medication adherence and secondary prevention. \par \par Since his last visit Mr Angel has resumed taking his DAPT as prescribed. He is asymptomatic from a cardiac perspective. Blood pressure remains elevated today despite being commenced on carvedilol last month.

## 2023-03-06 NOTE — CARDIOLOGY SUMMARY
[de-identified] : 1/30/2023 - SR. [de-identified] : 11/12/2021 - pLAD Stephon 5.0x18mm, ostial D1 3.5mm POBA.

## 2023-08-02 ENCOUNTER — APPOINTMENT (OUTPATIENT)
Dept: CARDIOLOGY | Facility: CLINIC | Age: 48
End: 2023-08-02
Payer: COMMERCIAL

## 2023-08-02 VITALS — DIASTOLIC BLOOD PRESSURE: 84 MMHG | SYSTOLIC BLOOD PRESSURE: 123 MMHG | HEART RATE: 67 BPM | OXYGEN SATURATION: 98 %

## 2023-08-02 PROCEDURE — 93000 ELECTROCARDIOGRAM COMPLETE: CPT

## 2023-08-02 PROCEDURE — 99214 OFFICE O/P EST MOD 30 MIN: CPT | Mod: 25

## 2023-08-08 RX ORDER — CARVEDILOL 12.5 MG/1
12.5 TABLET, FILM COATED ORAL TWICE DAILY
Qty: 180 | Refills: 3 | Status: ACTIVE | COMMUNITY
Start: 2021-11-23 | End: 1900-01-01

## 2023-09-13 RX ORDER — LISINOPRIL 40 MG/1
40 TABLET ORAL DAILY
Qty: 90 | Refills: 3 | Status: ACTIVE | COMMUNITY
Start: 1900-01-01 | End: 1900-01-01

## 2023-09-13 RX ORDER — ATORVASTATIN CALCIUM 80 MG/1
80 TABLET, FILM COATED ORAL
Qty: 1 | Refills: 2 | Status: ACTIVE | COMMUNITY
Start: 1900-01-01 | End: 1900-01-01

## 2023-09-13 RX ORDER — CLOPIDOGREL BISULFATE 75 MG/1
75 TABLET, FILM COATED ORAL DAILY
Qty: 1 | Refills: 3 | Status: ACTIVE | COMMUNITY
Start: 1900-01-01 | End: 1900-01-01

## 2023-10-24 RX ORDER — SPIRONOLACTONE 25 MG/1
25 TABLET ORAL DAILY
Qty: 90 | Refills: 2 | Status: ACTIVE | COMMUNITY
Start: 1900-01-01 | End: 1900-01-01

## 2023-12-13 ENCOUNTER — APPOINTMENT (OUTPATIENT)
Dept: CARDIOLOGY | Facility: CLINIC | Age: 48
End: 2023-12-13
Payer: COMMERCIAL

## 2023-12-13 ENCOUNTER — NON-APPOINTMENT (OUTPATIENT)
Age: 48
End: 2023-12-13

## 2023-12-13 VITALS
SYSTOLIC BLOOD PRESSURE: 123 MMHG | DIASTOLIC BLOOD PRESSURE: 78 MMHG | OXYGEN SATURATION: 98 % | BODY MASS INDEX: 38.5 KG/M2 | HEIGHT: 71 IN | HEART RATE: 77 BPM | WEIGHT: 275 LBS

## 2023-12-13 PROCEDURE — 93000 ELECTROCARDIOGRAM COMPLETE: CPT

## 2023-12-13 PROCEDURE — 99214 OFFICE O/P EST MOD 30 MIN: CPT | Mod: 25

## 2024-06-10 ENCOUNTER — APPOINTMENT (OUTPATIENT)
Dept: CARDIOLOGY | Facility: CLINIC | Age: 49
End: 2024-06-10
Payer: COMMERCIAL

## 2024-06-10 VITALS
SYSTOLIC BLOOD PRESSURE: 126 MMHG | WEIGHT: 272 LBS | DIASTOLIC BLOOD PRESSURE: 82 MMHG | HEIGHT: 71 IN | BODY MASS INDEX: 38.08 KG/M2 | HEART RATE: 74 BPM | OXYGEN SATURATION: 98 %

## 2024-06-10 DIAGNOSIS — I25.10 ATHEROSCLEROTIC HEART DISEASE OF NATIVE CORONARY ARTERY W/OUT ANGINA PECTORIS: ICD-10-CM

## 2024-06-10 DIAGNOSIS — Z00.00 ENCOUNTER FOR GENERAL ADULT MEDICAL EXAMINATION W/OUT ABNORMAL FINDINGS: ICD-10-CM

## 2024-06-10 DIAGNOSIS — I10 ESSENTIAL (PRIMARY) HYPERTENSION: ICD-10-CM

## 2024-06-10 PROCEDURE — G2211 COMPLEX E/M VISIT ADD ON: CPT

## 2024-06-10 PROCEDURE — 99214 OFFICE O/P EST MOD 30 MIN: CPT

## 2024-06-10 PROCEDURE — 93000 ELECTROCARDIOGRAM COMPLETE: CPT

## 2024-07-01 PROBLEM — I10 BENIGN ESSENTIAL HYPERTENSION: Status: ACTIVE | Noted: 2021-11-23

## 2024-07-01 PROBLEM — Z00.00 ENCOUNTER FOR PREVENTIVE HEALTH EXAMINATION: Status: ACTIVE | Noted: 2021-11-16

## 2024-07-01 PROBLEM — I25.10 CAD (CORONARY ARTERY DISEASE): Status: ACTIVE | Noted: 2021-11-23

## 2024-07-26 ENCOUNTER — RX RENEWAL (OUTPATIENT)
Age: 49
End: 2024-07-26

## 2024-08-29 ENCOUNTER — RX RENEWAL (OUTPATIENT)
Age: 49
End: 2024-08-29

## 2024-09-03 ENCOUNTER — RX RENEWAL (OUTPATIENT)
Age: 49
End: 2024-09-03

## 2024-09-04 RX ORDER — ATORVASTATIN CALCIUM 80 MG/1
80 TABLET, FILM COATED ORAL
Qty: 90 | Refills: 1 | Status: ACTIVE | COMMUNITY
Start: 2024-09-03 | End: 1900-01-01

## 2024-09-19 ENCOUNTER — RESULT REVIEW (OUTPATIENT)
Age: 49
End: 2024-09-19

## 2024-09-19 ENCOUNTER — APPOINTMENT (OUTPATIENT)
Dept: CV DIAGNOSITCS | Facility: HOSPITAL | Age: 49
End: 2024-09-19

## 2024-09-24 ENCOUNTER — NON-APPOINTMENT (OUTPATIENT)
Age: 49
End: 2024-09-24

## 2024-09-25 ENCOUNTER — APPOINTMENT (OUTPATIENT)
Dept: CARDIOLOGY | Facility: CLINIC | Age: 49
End: 2024-09-25

## 2024-09-25 VITALS
WEIGHT: 280 LBS | OXYGEN SATURATION: 97 % | BODY MASS INDEX: 39.05 KG/M2 | HEART RATE: 58 BPM | DIASTOLIC BLOOD PRESSURE: 79 MMHG | SYSTOLIC BLOOD PRESSURE: 118 MMHG

## 2024-09-25 PROCEDURE — G2211 COMPLEX E/M VISIT ADD ON: CPT | Mod: NC

## 2024-09-25 PROCEDURE — 99214 OFFICE O/P EST MOD 30 MIN: CPT

## 2024-09-25 PROCEDURE — 93000 ELECTROCARDIOGRAM COMPLETE: CPT

## 2024-12-09 ENCOUNTER — RX RENEWAL (OUTPATIENT)
Age: 49
End: 2024-12-09

## 2025-01-15 ENCOUNTER — APPOINTMENT (OUTPATIENT)
Dept: CARDIOLOGY | Facility: CLINIC | Age: 50
End: 2025-01-15

## 2025-01-15 ENCOUNTER — NON-APPOINTMENT (OUTPATIENT)
Age: 50
End: 2025-01-15

## 2025-01-15 VITALS
HEIGHT: 71 IN | BODY MASS INDEX: 39.2 KG/M2 | HEART RATE: 78 BPM | WEIGHT: 280 LBS | OXYGEN SATURATION: 98 % | SYSTOLIC BLOOD PRESSURE: 135 MMHG | DIASTOLIC BLOOD PRESSURE: 85 MMHG

## 2025-01-15 PROCEDURE — 99214 OFFICE O/P EST MOD 30 MIN: CPT

## 2025-01-15 PROCEDURE — 93000 ELECTROCARDIOGRAM COMPLETE: CPT

## 2025-01-15 PROCEDURE — G2211 COMPLEX E/M VISIT ADD ON: CPT | Mod: NC

## 2025-01-15 RX ORDER — LOSARTAN POTASSIUM 50 MG/1
50 TABLET, FILM COATED ORAL
Qty: 90 | Refills: 3 | Status: ACTIVE | COMMUNITY
Start: 2025-01-15 | End: 1900-01-01

## 2025-05-19 ENCOUNTER — NON-APPOINTMENT (OUTPATIENT)
Age: 50
End: 2025-05-19

## 2025-05-19 ENCOUNTER — APPOINTMENT (OUTPATIENT)
Dept: CARDIOLOGY | Facility: CLINIC | Age: 50
End: 2025-05-19

## 2025-05-19 VITALS
HEART RATE: 76 BPM | OXYGEN SATURATION: 98 % | HEIGHT: 71 IN | DIASTOLIC BLOOD PRESSURE: 82 MMHG | SYSTOLIC BLOOD PRESSURE: 115 MMHG | BODY MASS INDEX: 39.2 KG/M2 | WEIGHT: 280 LBS

## 2025-05-19 PROCEDURE — 93000 ELECTROCARDIOGRAM COMPLETE: CPT

## 2025-05-19 PROCEDURE — 99214 OFFICE O/P EST MOD 30 MIN: CPT

## 2025-05-19 PROCEDURE — G2211 COMPLEX E/M VISIT ADD ON: CPT | Mod: NC

## 2025-05-21 ENCOUNTER — APPOINTMENT (OUTPATIENT)
Dept: CARDIOLOGY | Facility: CLINIC | Age: 50
End: 2025-05-21